# Patient Record
Sex: FEMALE | Race: WHITE | NOT HISPANIC OR LATINO | Employment: PART TIME | URBAN - NONMETROPOLITAN AREA
[De-identification: names, ages, dates, MRNs, and addresses within clinical notes are randomized per-mention and may not be internally consistent; named-entity substitution may affect disease eponyms.]

---

## 2023-02-28 LAB
ANION GAP IN SER/PLAS: 10 MMOL/L (ref 10–20)
CALCIUM (MG/DL) IN SER/PLAS: 9.5 MG/DL (ref 8.6–10.3)
CARBON DIOXIDE, TOTAL (MMOL/L) IN SER/PLAS: 27 MMOL/L (ref 21–32)
CHLORIDE (MMOL/L) IN SER/PLAS: 108 MMOL/L (ref 98–107)
CHOLESTEROL (MG/DL) IN SER/PLAS: 219 MG/DL (ref 0–199)
CHOLESTEROL IN HDL (MG/DL) IN SER/PLAS: 73 MG/DL
CHOLESTEROL/HDL RATIO: 3
CREATININE (MG/DL) IN SER/PLAS: 0.64 MG/DL (ref 0.5–1.05)
GFR FEMALE: >90 ML/MIN/1.73M2
GLUCOSE (MG/DL) IN SER/PLAS: 84 MG/DL (ref 74–99)
LDL: 128 MG/DL (ref 0–99)
POTASSIUM (MMOL/L) IN SER/PLAS: 4.1 MMOL/L (ref 3.5–5.3)
SODIUM (MMOL/L) IN SER/PLAS: 141 MMOL/L (ref 136–145)
TRIGLYCERIDE (MG/DL) IN SER/PLAS: 90 MG/DL (ref 0–149)
UREA NITROGEN (MG/DL) IN SER/PLAS: 18 MG/DL (ref 6–23)
VLDL: 18 MG/DL (ref 0–40)

## 2023-08-28 ENCOUNTER — APPOINTMENT (OUTPATIENT)
Dept: PRIMARY CARE | Facility: CLINIC | Age: 58
End: 2023-08-28
Payer: COMMERCIAL

## 2023-09-25 ENCOUNTER — TELEPHONE (OUTPATIENT)
Dept: PRIMARY CARE | Facility: CLINIC | Age: 58
End: 2023-09-25

## 2023-09-25 RX ORDER — PAROXETINE HYDROCHLORIDE 40 MG/1
TABLET, FILM COATED ORAL
Qty: 90 TABLET | OUTPATIENT
Start: 2023-09-25

## 2023-09-25 RX ORDER — PAROXETINE 10 MG/1
TABLET, FILM COATED ORAL
Qty: 90 TABLET | OUTPATIENT
Start: 2023-09-25

## 2023-09-27 NOTE — TELEPHONE ENCOUNTER
Needs refills on two prescriptions- states she is at work and doesn't know the names- please call after 1p

## 2023-09-29 DIAGNOSIS — F41.9 ANXIETY: ICD-10-CM

## 2023-09-29 DIAGNOSIS — F32.A DEPRESSION, UNSPECIFIED DEPRESSION TYPE: ICD-10-CM

## 2023-10-02 RX ORDER — PAROXETINE 10 MG/1
TABLET, FILM COATED ORAL
Qty: 90 TABLET | OUTPATIENT
Start: 2023-10-02

## 2023-10-02 RX ORDER — BUSPIRONE HYDROCHLORIDE 15 MG/1
15 TABLET ORAL 2 TIMES DAILY
Qty: 180 TABLET | OUTPATIENT
Start: 2023-10-02

## 2023-10-02 RX ORDER — PAROXETINE HYDROCHLORIDE 40 MG/1
TABLET, FILM COATED ORAL
Qty: 90 TABLET | OUTPATIENT
Start: 2023-10-02

## 2023-10-03 ENCOUNTER — DOCUMENTATION (OUTPATIENT)
Dept: PRIMARY CARE | Facility: CLINIC | Age: 58
End: 2023-10-03
Payer: COMMERCIAL

## 2023-10-03 RX ORDER — PAROXETINE 10 MG/1
1 TABLET, FILM COATED ORAL DAILY
COMMUNITY
Start: 2021-04-26 | End: 2023-10-03 | Stop reason: SDUPTHER

## 2023-10-03 RX ORDER — PAROXETINE HYDROCHLORIDE 40 MG/1
1 TABLET, FILM COATED ORAL DAILY
COMMUNITY
End: 2023-10-03 | Stop reason: SDUPTHER

## 2023-10-03 RX ORDER — PAROXETINE 10 MG/1
10 TABLET, FILM COATED ORAL DAILY
Qty: 30 TABLET | Refills: 0 | Status: SHIPPED | OUTPATIENT
Start: 2023-10-03 | End: 2023-11-10 | Stop reason: SDUPTHER

## 2023-10-03 RX ORDER — PAROXETINE HYDROCHLORIDE 40 MG/1
40 TABLET, FILM COATED ORAL DAILY
Qty: 30 TABLET | Refills: 0 | Status: SHIPPED | OUTPATIENT
Start: 2023-10-03 | End: 2023-11-10 | Stop reason: SDUPTHER

## 2023-11-08 ENCOUNTER — TELEPHONE (OUTPATIENT)
Dept: PRIMARY CARE | Facility: CLINIC | Age: 58
End: 2023-11-08
Payer: COMMERCIAL

## 2023-11-08 DIAGNOSIS — F41.9 ANXIETY: ICD-10-CM

## 2023-11-08 DIAGNOSIS — F32.A DEPRESSION, UNSPECIFIED DEPRESSION TYPE: ICD-10-CM

## 2023-11-09 RX ORDER — PAROXETINE 10 MG/1
10 TABLET, FILM COATED ORAL DAILY
Qty: 30 TABLET | Refills: 0 | OUTPATIENT
Start: 2023-11-09

## 2023-11-09 RX ORDER — PAROXETINE HYDROCHLORIDE 40 MG/1
40 TABLET, FILM COATED ORAL DAILY
Qty: 30 TABLET | Refills: 0 | OUTPATIENT
Start: 2023-11-09

## 2023-11-10 RX ORDER — PAROXETINE 10 MG/1
10 TABLET, FILM COATED ORAL DAILY
Qty: 90 TABLET | Refills: 3 | Status: SHIPPED | OUTPATIENT
Start: 2023-11-10 | End: 2024-11-04

## 2023-11-10 RX ORDER — PAROXETINE HYDROCHLORIDE 40 MG/1
40 TABLET, FILM COATED ORAL DAILY
Qty: 90 TABLET | Refills: 3 | Status: SHIPPED | OUTPATIENT
Start: 2023-11-10 | End: 2024-11-04

## 2023-12-13 ENCOUNTER — OFFICE VISIT (OUTPATIENT)
Dept: PRIMARY CARE | Facility: CLINIC | Age: 58
End: 2023-12-13
Payer: COMMERCIAL

## 2023-12-13 VITALS
HEART RATE: 91 BPM | BODY MASS INDEX: 28.95 KG/M2 | OXYGEN SATURATION: 95 % | DIASTOLIC BLOOD PRESSURE: 96 MMHG | SYSTOLIC BLOOD PRESSURE: 138 MMHG | WEIGHT: 163.4 LBS | HEIGHT: 63 IN

## 2023-12-13 DIAGNOSIS — M06.9 RHEUMATOID ARTHRITIS, INVOLVING UNSPECIFIED SITE, UNSPECIFIED WHETHER RHEUMATOID FACTOR PRESENT (MULTI): Primary | ICD-10-CM

## 2023-12-13 DIAGNOSIS — Z13.220 LIPID SCREENING: ICD-10-CM

## 2023-12-13 DIAGNOSIS — Z00.00 HEALTHCARE MAINTENANCE: ICD-10-CM

## 2023-12-13 PROCEDURE — 1036F TOBACCO NON-USER: CPT | Performed by: STUDENT IN AN ORGANIZED HEALTH CARE EDUCATION/TRAINING PROGRAM

## 2023-12-13 PROCEDURE — 99213 OFFICE O/P EST LOW 20 MIN: CPT | Performed by: STUDENT IN AN ORGANIZED HEALTH CARE EDUCATION/TRAINING PROGRAM

## 2023-12-13 RX ORDER — ETANERCEPT 50 MG/ML
50 SOLUTION SUBCUTANEOUS
COMMUNITY
Start: 2020-05-20

## 2023-12-13 RX ORDER — DIPHENHYDRAMINE HCL 50 MG
CAPSULE ORAL
COMMUNITY
End: 2024-02-22 | Stop reason: ENTERED-IN-ERROR

## 2023-12-13 RX ORDER — PNV NO.95/FERROUS FUM/FOLIC AC 28MG-0.8MG
100 TABLET ORAL
COMMUNITY
Start: 2007-10-10

## 2023-12-13 RX ORDER — AMOXICILLIN 500 MG
2 CAPSULE ORAL
COMMUNITY

## 2023-12-13 RX ORDER — AMPICILLIN TRIHYDRATE 250 MG
2 CAPSULE ORAL 2 TIMES DAILY
COMMUNITY

## 2023-12-13 RX ORDER — LEFLUNOMIDE 20 MG/1
20 TABLET ORAL EVERY 24 HOURS
COMMUNITY
Start: 2020-05-20

## 2023-12-13 RX ORDER — ASCORBIC ACID 250 MG
250 TABLET ORAL DAILY
COMMUNITY

## 2023-12-13 RX ORDER — DENOSUMAB 60 MG/ML
60 INJECTION SUBCUTANEOUS ONCE
COMMUNITY
Start: 2023-10-30 | End: 2024-02-22 | Stop reason: ENTERED-IN-ERROR

## 2023-12-13 RX ORDER — CALCIUM CARBONATE 300MG(750)
1 TABLET,CHEWABLE ORAL DAILY
COMMUNITY

## 2023-12-13 RX ORDER — BUSPIRONE HYDROCHLORIDE 15 MG/1
15 TABLET ORAL EVERY 12 HOURS
COMMUNITY
Start: 2022-10-10 | End: 2023-12-26

## 2023-12-13 RX ORDER — DOCUSATE SODIUM 100 MG/1
200 CAPSULE, LIQUID FILLED ORAL NIGHTLY
COMMUNITY

## 2023-12-13 RX ORDER — MULTIVITAMIN
1 TABLET ORAL
COMMUNITY

## 2023-12-13 ASSESSMENT — PATIENT HEALTH QUESTIONNAIRE - PHQ9
2. FEELING DOWN, DEPRESSED OR HOPELESS: NOT AT ALL
1. LITTLE INTEREST OR PLEASURE IN DOING THINGS: NOT AT ALL
SUM OF ALL RESPONSES TO PHQ9 QUESTIONS 1 AND 2: 0

## 2023-12-13 NOTE — PROGRESS NOTES
Subjective   Patient ID: Bonny Koehler is a 58 y.o. female who presents for Sick (PT is here today for possible heart issues, she reports she has a dx for osteoporosis and takes a shot for this 2 times a year for this and got the first injection in Nov. Thinks she was having side effects from the shot, was having severe hip pain, could not sleep and itching skin. Dr dismissed this, pt believes this was all do to the injection. Knows she has high cholesterol, wanted to do try this naturally. Specialist wanted to rule out everything but she knows this was caused by the shot.  ).    HPI    Patient is here to discuss about multiple symptoms that she has been having since taking Prolia last month. Reports that she started to have symptoms by 2-3 days after the injection.   Hip pain - improved after a back injection and exercises.   Itching   Insomnia  Pain in the neck, weakness in the neck muscles  Hip pain  Jaw pain/gum pain    Hyperlipidemia: concerned about hyperlipidemia. Last lab in the chart is in normal range. But she thinks a more recent doug was higher.   She is planning to work on conservative measures.     Hypertension: Blood pressure is elevated today.   Has been normal in the past. This is listed as one of the side-effects of prolia as well. .Previous blood pressures have been normal   Recommended to monitor BP at home and let us know if persistently elevated.     Review of Systems  ROS negative except discussed above in HPI.    Vitals:    12/13/23 1138   BP: (!) 138/96   Pulse: 91   SpO2: 95%     Objective   Physical Exam  Constitutional:       Appearance: Normal appearance.   Cardiovascular:      Rate and Rhythm: Normal rate and regular rhythm.   Pulmonary:      Effort: Pulmonary effort is normal.      Breath sounds: Normal breath sounds.   Musculoskeletal:      Cervical back: Normal range of motion and neck supple.   Lymphadenopathy:      Cervical: No cervical adenopathy.   Neurological:      Mental  Status: She is alert.           Assessment/Plan   Bonny was seen today for sick.  Diagnoses and all orders for this visit:  Rheumatoid arthritis, involving unspecified site, unspecified whether rheumatoid factor present (CMS/[facility]) (Primary)  Lipid screening  -     Lipid Panel; Future  Healthcare maintenance  -     Basic Metabolic Panel; Future      Follow up in 3 months. Sooner if blood pressure is high.          Gulshan Dodge MD MPH

## 2023-12-20 DIAGNOSIS — F32.A DEPRESSION, UNSPECIFIED DEPRESSION TYPE: ICD-10-CM

## 2023-12-20 DIAGNOSIS — F41.9 ANXIETY: Primary | ICD-10-CM

## 2023-12-26 RX ORDER — BUSPIRONE HYDROCHLORIDE 15 MG/1
15 TABLET ORAL 2 TIMES DAILY
Qty: 180 TABLET | Refills: 3 | Status: SHIPPED | OUTPATIENT
Start: 2023-12-26 | End: 2024-12-25

## 2024-01-24 ENCOUNTER — TELEPHONE (OUTPATIENT)
Dept: PRIMARY CARE | Facility: CLINIC | Age: 59
End: 2024-01-24
Payer: COMMERCIAL

## 2024-01-24 DIAGNOSIS — R53.83 OTHER FATIGUE: ICD-10-CM

## 2024-01-24 DIAGNOSIS — M06.9 RHEUMATOID ARTHRITIS, INVOLVING UNSPECIFIED SITE, UNSPECIFIED WHETHER RHEUMATOID FACTOR PRESENT (MULTI): Primary | ICD-10-CM

## 2024-01-24 NOTE — TELEPHONE ENCOUNTER
Asking if she can get orders for iron and thyroid for her upcoming appointment in March. Asking for a call back.

## 2024-02-22 ENCOUNTER — APPOINTMENT (OUTPATIENT)
Dept: RADIOLOGY | Facility: HOSPITAL | Age: 59
End: 2024-02-22
Payer: COMMERCIAL

## 2024-02-22 ENCOUNTER — HOSPITAL ENCOUNTER (EMERGENCY)
Facility: HOSPITAL | Age: 59
Discharge: HOME | End: 2024-02-22
Attending: EMERGENCY MEDICINE
Payer: COMMERCIAL

## 2024-02-22 ENCOUNTER — HOSPITAL ENCOUNTER (OUTPATIENT)
Dept: CARDIOLOGY | Facility: HOSPITAL | Age: 59
Discharge: HOME | End: 2024-02-22
Payer: COMMERCIAL

## 2024-02-22 VITALS
WEIGHT: 160 LBS | RESPIRATION RATE: 16 BRPM | SYSTOLIC BLOOD PRESSURE: 129 MMHG | HEART RATE: 82 BPM | OXYGEN SATURATION: 97 % | DIASTOLIC BLOOD PRESSURE: 79 MMHG | TEMPERATURE: 97.2 F | BODY MASS INDEX: 29.44 KG/M2 | HEIGHT: 62 IN

## 2024-02-22 DIAGNOSIS — R07.9 CHEST PAIN, UNSPECIFIED TYPE: Primary | ICD-10-CM

## 2024-02-22 LAB
ALBUMIN SERPL BCP-MCNC: 4.2 G/DL (ref 3.4–5)
ALP SERPL-CCNC: 52 U/L (ref 33–110)
ALT SERPL W P-5'-P-CCNC: 25 U/L (ref 7–45)
ANION GAP SERPL CALC-SCNC: 10 MMOL/L (ref 10–20)
AST SERPL W P-5'-P-CCNC: 26 U/L (ref 9–39)
BASOPHILS # BLD AUTO: 0.07 X10*3/UL (ref 0–0.1)
BASOPHILS NFR BLD AUTO: 1.4 %
BILIRUB SERPL-MCNC: 0.6 MG/DL (ref 0–1.2)
BUN SERPL-MCNC: 18 MG/DL (ref 6–23)
CALCIUM SERPL-MCNC: 9.2 MG/DL (ref 8.6–10.3)
CARDIAC TROPONIN I PNL SERPL HS: 3 NG/L (ref 0–13)
CARDIAC TROPONIN I PNL SERPL HS: 3 NG/L (ref 0–13)
CHLORIDE SERPL-SCNC: 109 MMOL/L (ref 98–107)
CO2 SERPL-SCNC: 25 MMOL/L (ref 21–32)
CREAT SERPL-MCNC: 0.57 MG/DL (ref 0.5–1.05)
D DIMER PPP FEU-MCNC: 258 NG/ML FEU
EGFRCR SERPLBLD CKD-EPI 2021: >90 ML/MIN/1.73M*2
EOSINOPHIL # BLD AUTO: 0.1 X10*3/UL (ref 0–0.7)
EOSINOPHIL NFR BLD AUTO: 2 %
ERYTHROCYTE [DISTWIDTH] IN BLOOD BY AUTOMATED COUNT: 12.6 % (ref 11.5–14.5)
FLUAV RNA RESP QL NAA+PROBE: NOT DETECTED
FLUBV RNA RESP QL NAA+PROBE: NOT DETECTED
GLUCOSE SERPL-MCNC: 80 MG/DL (ref 74–99)
HCT VFR BLD AUTO: 41.3 % (ref 36–46)
HGB BLD-MCNC: 13.5 G/DL (ref 12–16)
IMM GRANULOCYTES # BLD AUTO: 0 X10*3/UL (ref 0–0.7)
IMM GRANULOCYTES NFR BLD AUTO: 0 % (ref 0–0.9)
LYMPHOCYTES # BLD AUTO: 1.8 X10*3/UL (ref 1.2–4.8)
LYMPHOCYTES NFR BLD AUTO: 36.7 %
MCH RBC QN AUTO: 30.3 PG (ref 26–34)
MCHC RBC AUTO-ENTMCNC: 32.7 G/DL (ref 32–36)
MCV RBC AUTO: 93 FL (ref 80–100)
MONOCYTES # BLD AUTO: 0.64 X10*3/UL (ref 0.1–1)
MONOCYTES NFR BLD AUTO: 13 %
NEUTROPHILS # BLD AUTO: 2.3 X10*3/UL (ref 1.2–7.7)
NEUTROPHILS NFR BLD AUTO: 46.9 %
NRBC BLD-RTO: 0 /100 WBCS (ref 0–0)
PLATELET # BLD AUTO: 168 X10*3/UL (ref 150–450)
POTASSIUM SERPL-SCNC: 4.1 MMOL/L (ref 3.5–5.3)
PROT SERPL-MCNC: 6.8 G/DL (ref 6.4–8.2)
RBC # BLD AUTO: 4.46 X10*6/UL (ref 4–5.2)
SARS-COV-2 RNA RESP QL NAA+PROBE: NOT DETECTED
SODIUM SERPL-SCNC: 140 MMOL/L (ref 136–145)
WBC # BLD AUTO: 4.9 X10*3/UL (ref 4.4–11.3)

## 2024-02-22 PROCEDURE — 70450 CT HEAD/BRAIN W/O DYE: CPT | Performed by: RADIOLOGY

## 2024-02-22 PROCEDURE — 84484 ASSAY OF TROPONIN QUANT: CPT | Performed by: NURSE PRACTITIONER

## 2024-02-22 PROCEDURE — 99285 EMERGENCY DEPT VISIT HI MDM: CPT | Mod: 25

## 2024-02-22 PROCEDURE — 36415 COLL VENOUS BLD VENIPUNCTURE: CPT | Performed by: NURSE PRACTITIONER

## 2024-02-22 PROCEDURE — 85025 COMPLETE CBC W/AUTO DIFF WBC: CPT | Performed by: NURSE PRACTITIONER

## 2024-02-22 PROCEDURE — 93005 ELECTROCARDIOGRAM TRACING: CPT

## 2024-02-22 PROCEDURE — 87636 SARSCOV2 & INF A&B AMP PRB: CPT | Performed by: NURSE PRACTITIONER

## 2024-02-22 PROCEDURE — 71045 X-RAY EXAM CHEST 1 VIEW: CPT

## 2024-02-22 PROCEDURE — 96372 THER/PROPH/DIAG INJ SC/IM: CPT

## 2024-02-22 PROCEDURE — 85379 FIBRIN DEGRADATION QUANT: CPT | Performed by: NURSE PRACTITIONER

## 2024-02-22 PROCEDURE — 2500000004 HC RX 250 GENERAL PHARMACY W/ HCPCS (ALT 636 FOR OP/ED): Performed by: NURSE PRACTITIONER

## 2024-02-22 PROCEDURE — 71045 X-RAY EXAM CHEST 1 VIEW: CPT | Performed by: RADIOLOGY

## 2024-02-22 PROCEDURE — 70450 CT HEAD/BRAIN W/O DYE: CPT

## 2024-02-22 PROCEDURE — 80053 COMPREHEN METABOLIC PANEL: CPT | Performed by: NURSE PRACTITIONER

## 2024-02-22 RX ORDER — TALC
6 POWDER (GRAM) TOPICAL NIGHTLY
COMMUNITY

## 2024-02-22 RX ORDER — KETOROLAC TROMETHAMINE 30 MG/ML
30 INJECTION, SOLUTION INTRAMUSCULAR; INTRAVENOUS ONCE
Status: COMPLETED | OUTPATIENT
Start: 2024-02-22 | End: 2024-02-22

## 2024-02-22 RX ORDER — CHOLECALCIFEROL (VITAMIN D3) 25 MCG
1000 TABLET ORAL DAILY
COMMUNITY

## 2024-02-22 RX ORDER — OMEPRAZOLE 20 MG/1
20 TABLET, DELAYED RELEASE ORAL AS NEEDED
COMMUNITY

## 2024-02-22 RX ORDER — NAPROXEN 250 MG/1
250 TABLET ORAL AS NEEDED
COMMUNITY

## 2024-02-22 RX ORDER — KETOROLAC TROMETHAMINE 30 MG/ML
15 INJECTION, SOLUTION INTRAMUSCULAR; INTRAVENOUS ONCE
Status: DISCONTINUED | OUTPATIENT
Start: 2024-02-22 | End: 2024-02-22

## 2024-02-22 RX ORDER — DEXTROMETHORPHAN HYDROBROMIDE, GUAIFENESIN 5; 100 MG/5ML; MG/5ML
1300 LIQUID ORAL EVERY 8 HOURS PRN
COMMUNITY

## 2024-02-22 RX ORDER — PREDNISONE 2.5 MG/1
2.5 TABLET ORAL AS NEEDED
COMMUNITY

## 2024-02-22 RX ADMIN — KETOROLAC TROMETHAMINE 30 MG: 30 INJECTION, SOLUTION INTRAMUSCULAR; INTRAVENOUS at 15:50

## 2024-02-22 ASSESSMENT — COLUMBIA-SUICIDE SEVERITY RATING SCALE - C-SSRS
6. HAVE YOU EVER DONE ANYTHING, STARTED TO DO ANYTHING, OR PREPARED TO DO ANYTHING TO END YOUR LIFE?: NO
2. HAVE YOU ACTUALLY HAD ANY THOUGHTS OF KILLING YOURSELF?: NO
1. IN THE PAST MONTH, HAVE YOU WISHED YOU WERE DEAD OR WISHED YOU COULD GO TO SLEEP AND NOT WAKE UP?: NO

## 2024-02-22 ASSESSMENT — HEART SCORE
RISK FACTORS: 1-2 RISK FACTORS
HEART SCORE: 3
HISTORY: MODERATELY SUSPICIOUS
ECG: NORMAL
AGE: 45-64
TROPONIN: LESS THAN OR EQUAL TO NORMAL LIMIT

## 2024-02-22 ASSESSMENT — PAIN - FUNCTIONAL ASSESSMENT: PAIN_FUNCTIONAL_ASSESSMENT: 0-10

## 2024-02-22 ASSESSMENT — PAIN SCALES - GENERAL: PAINLEVEL_OUTOF10: 5 - MODERATE PAIN

## 2024-02-22 NOTE — ED PROVIDER NOTES
"Emergency Department Encounter  [facility] EMERGENCY MEDICINE    Patient: Bonny Koehler  MRN: [id number]  : 1965  Date of Evaluation: 2024  ED Provider: WILNER Arevalo    ED care was supervised by Dr. Torres who independently examined and evaluated the patient. Please see their attestation note for further details.    Limitations to history: none  Independent Historian: self  Records reviewed: Care everywhere, paper chart, Inpatient and outpatient notes    Chief Complaint       Chief Complaint   Patient presents with    Dizziness     Patient reports for 1 week having episode of dizziness and difficulty \"saying what I am thinking\", chest pain, SOB with exertion, L arm pain and a headache. Denies fever or urinary difficulties, N/V/D     [place]    (Location/Symptom, Timing/Onset, Context/Setting, Quality, Duration, Modifying Factors, Severity) Note limiting factors.   Bonny Koehler is a 58 y.o. female who presents to the emergency department complaining of 1 week of intermittent symptoms, initially began as left arm pain that went from the neck all the way down her arm, has a history of rheumatoid arthritis and felt like it was an exacerbation of her rheumatoid arthritis.  Then started having some shortness of breath with exertion over the last few days, chest burning that has been intermittent but more constant over the last few days.  Denies any cough, hemoptysis, abdominal pain, does endorse some nausea and intermittent episodes of diaphoresis.  Also reporting lightheadedness, \"foggy feeling\" in the head with some difficulty getting her words out that comes and goes.  Denies any slurred speech, unilateral weakness, visual changes.  Does endorse palpitations, does report more recent issues with her memory.  Also states that she was having some symptoms after starting a new medication in November, and injection for her osteoporosis and believes that a lot of her issues are due to " this medication.    ROS:     Review of Systems  14 systems reviewed and otherwise acutely negative except as in the [place].          Past History     Past Medical History:   Diagnosis Date    Osteoporosis     RA (rheumatoid arthritis) (CMS/[facility])      Past Surgical History:   Procedure Laterality Date    OTHER SURGICAL HISTORY  2020     section    OTHER SURGICAL HISTORY  2020    Hemorrhoidectomy    OTHER SURGICAL HISTORY  2020    Colonoscopy    OTHER SURGICAL HISTORY  2021    Hysterectomy     Social History     Socioeconomic History    Marital status:      Spouse name: None    Number of children: None    Years of education: None    Highest education level: None   Occupational History    None   Tobacco Use    Smoking status: Former     Types: Cigarettes     Quit date:      Years since quitting: 10.1    Smokeless tobacco: Never   Vaping Use    Vaping Use: Former   Substance and Sexual Activity    Alcohol use: Not Currently    Drug use: Never    Sexual activity: None   Other Topics Concern    None   Social History Narrative    None     Social Determinants of Health     Financial Resource Strain: Not on file   Food Insecurity: Not on file   Transportation Needs: Not on file   Physical Activity: Not on file   Stress: Not on file   Social Connections: Not on file   Intimate Partner Violence: Not on file   Housing Stability: Not on file       Medications/Allergies     Previous Medications    ASCORBIC ACID (VITAMIN C) 100 MG TABLET    Take by mouth.    BUSPIRONE (BUSPAR) 15 MG TABLET    Take 1 tablet (15 mg) by mouth 2 times a day.    CYANOCOBALAMIN (VITAMIN B-12) 100 MCG TABLET    Take 1 tablet (100 mcg) by mouth once daily.    DOCUSATE SODIUM (COLACE) 100 MG CAPSULE    Take 1 capsule (100 mg) by mouth 2 times a day.    ENBREL SURECLICK 50 MG/ML (1 ML) PEN INJECTOR PEN INJECTION    Inject 1 mL (50 mg) under the skin 1 (one) time per week.    L. ACIDOPHILUS/BIFID. ANIMALIS 32 BILLION  CELL CAPSULE    Take by mouth.    LEFLUNOMIDE (ARAVA) 20 MG TABLET    Take 1 tablet (20 mg) by mouth once every 24 hours.    MAGNESIUM OXIDE (MAG-OX) 400 MG TABLET    Take 1 tablet (400 mg) by mouth once daily.    MULTIVITAMIN TABLET    Take 1 tablet by mouth once daily.    OMEGA-3 FATTY ACIDS-FISH -1,000 MG CAPSULE    Take 2 capsules (2,000 mg) by mouth once daily.    PAROXETINE (PAXIL) 10 MG TABLET    Take 1 tablet (10 mg) by mouth once daily.    PAROXETINE (PAXIL) 40 MG TABLET    Take 1 tablet (40 mg) by mouth once daily.    RED YEAST RICE 600 MG CAPSULE    once every 24 hours.     No Known Allergies     Physical Exam       ED Triage Vitals [02/22/24 1410]   Temperature Heart Rate Respirations BP   36.2 °C (97.2 °F) 85 18 158/85      Pulse Ox Temp src Heart Rate Source Patient Position   96 % -- -- --      BP Location FiO2 (%)     -- --         Physical Exam    GENERAL:  The patient appears nourished and normally developed. Vital signs as documented.     HEENT:  Head normocephalic, atraumatic, EOMs intact, PERRLA, Mucous membranes moist. Nares patent without copious rhinorrhea.  No lymphadenopathy.    PULMONARY:  Lungs are clear to auscultation, without any respiratory distress. Able to speak full sentences, no accessory muscle use    CARDIAC:   Normal rate. No murmurs, rubs or gallops    ABDOMEN:  Soft, non-distended, non-tender, BS positive x 4 quadrants, No rebound or guarding, no peritoneal signs, no CVA tenderness, no masses or organomegaly    MUSCULOSKELETAL:   Able to ambulate, Non edematous, with no obvious deformities. Pulses intact distal    SKIN:   Good color, with no significant rashes.  No pallor.    NEURO:  No obvious neurological deficits, normal sensation and strength bilaterally.  Able to follow commands, NIH 0, CN 2-12 intact.        Diagnostics   Labs:  Results for orders placed or performed during the hospital encounter of 02/22/24   Sars-CoV-2 and Influenza A/B PCR   Result Value Ref  Range    Flu A Result Not Detected Not Detected    Flu B Result Not Detected Not Detected    Coronavirus 2019, PCR Not Detected Not Detected   CBC and Auto Differential   Result Value Ref Range    WBC 4.9 4.4 - 11.3 x10*3/uL    nRBC 0.0 0.0 - 0.0 /100 WBCs    RBC 4.46 4.00 - 5.20 x10*6/uL    Hemoglobin 13.5 12.0 - 16.0 g/dL    Hematocrit 41.3 36.0 - 46.0 %    MCV 93 80 - 100 fL    MCH 30.3 26.0 - 34.0 pg    MCHC 32.7 32.0 - 36.0 g/dL    RDW 12.6 11.5 - 14.5 %    Platelets 168 150 - 450 x10*3/uL    Neutrophils % 46.9 40.0 - 80.0 %    Immature Granulocytes %, Automated 0.0 0.0 - 0.9 %    Lymphocytes % 36.7 13.0 - 44.0 %    Monocytes % 13.0 2.0 - 10.0 %    Eosinophils % 2.0 0.0 - 6.0 %    Basophils % 1.4 0.0 - 2.0 %    Neutrophils Absolute 2.30 1.20 - 7.70 x10*3/uL    Immature Granulocytes Absolute, Automated 0.00 0.00 - 0.70 x10*3/uL    Lymphocytes Absolute 1.80 1.20 - 4.80 x10*3/uL    Monocytes Absolute 0.64 0.10 - 1.00 x10*3/uL    Eosinophils Absolute 0.10 0.00 - 0.70 x10*3/uL    Basophils Absolute 0.07 0.00 - 0.10 x10*3/uL   Comprehensive metabolic panel   Result Value Ref Range    Glucose 80 74 - 99 mg/dL    Sodium 140 136 - 145 mmol/L    Potassium 4.1 3.5 - 5.3 mmol/L    Chloride 109 (H) 98 - 107 mmol/L    Bicarbonate 25 21 - 32 mmol/L    Anion Gap 10 10 - 20 mmol/L    Urea Nitrogen 18 6 - 23 mg/dL    Creatinine 0.57 0.50 - 1.05 mg/dL    eGFR >90 >60 mL/min/1.73m*2    Calcium 9.2 8.6 - 10.3 mg/dL    Albumin 4.2 3.4 - 5.0 g/dL    Alkaline Phosphatase 52 33 - 110 U/L    Total Protein 6.8 6.4 - 8.2 g/dL    AST 26 9 - 39 U/L    Bilirubin, Total 0.6 0.0 - 1.2 mg/dL    ALT 25 7 - 45 U/L   Troponin I, High Sensitivity   Result Value Ref Range    Troponin I, High Sensitivity 3 0 - 13 ng/L   D-Dimer, Quantitative Non VTE   Result Value Ref Range    D-Dimer Non VTE, Quant (ng/mL FEU) 258 <=500 ng/mL FEU   Troponin I, High Sensitivity   Result Value Ref Range    Troponin I, High Sensitivity 3 0 - 13 ng/L  "    Radiographs:  CT head wo IV contrast   Final Result   No acute intracranial process.                  MACRO:   None.        Signed by: Kiko Sifuentes 2/22/2024 3:25 PM   Dictation workstation:   [id number]      XR chest 1 view   Final Result   No focal infiltrate or pneumothorax is identified.        MACRO:   None.        Signed by: Lucius Patel 2/22/2024 2:40 PM   Dictation workstation:   [id number]          Procedures:   Procedures           Assessment   In brief, Bonny Koehler is a 58 y.o. female who presented to the emergency department with multiple complaints, left arm pain, diaphoresis, nausea, chest pain, headache, \"brain fog\".    Plan   IV, lab work, EKG, imaging    Differentials   ACS   viral infection  Cervical radiculopathy  TIA  Anxiety  PE    ED Course     ED Course as of 02/22/24 1629   Thu Feb 22, 2024   1403 EKG performed at 1403 indication of chest pain, normal sinus rhythm, rate of 76, no ST elevation, normal intervals, normal axis [ML]   1618 Troponin I, High Sensitivity [JS]      ED Course User Index  [JS] Chapincito Torres MD  [ML] MARYANN Arevalo-CNP         Diagnoses as of 02/22/24 1629   Chest pain, unspecified type       Visit Vitals  /71   Pulse 79   Temp 36.2 °C (97.2 °F)   Resp 16   Ht 1.575 m (5' 2\")   Wt 72.6 kg (160 lb)   SpO2 (!) 93%   BMI 29.26 kg/m²   Smoking Status Former   BSA 1.78 m²       Medications   ketorolac (Toradol) injection 30 mg (30 mg intramuscular Given 2/22/24 1550)       Plan of care discussed, patient is stable appearing, EKG with no ST elevation, troponins were negative, second troponin were negative, heart score of 3, discussed admission versus outpatient follow-up, was able to contact cardiology and patient to have follow-up tomorrow at 245.  Patient is neurologically intact with an NIH of 0, CT scan was obtained and reviewed, patient will be discharged home in stable condition, educated on any worsening signs and symptoms to return to the " emergency department      Final Impression      1. Chest pain, unspecified type          DISPOSITION  Disposition: Discharge to home  Patient condition is stable    Comment: Please note this report has been produced using speech recognition software and may contain errors related to that system including errors in grammar, punctuation, and spelling, as well as words and phrases that may be inappropriate.  If there are any questions or concerns please feel free to contact the dictating provider for clarification.    WILNER Arevalo APRN-CNP  02/22/24 5230

## 2024-02-23 ENCOUNTER — OFFICE VISIT (OUTPATIENT)
Dept: CARDIOLOGY | Facility: CLINIC | Age: 59
End: 2024-02-23
Payer: COMMERCIAL

## 2024-02-23 DIAGNOSIS — R06.09 DOE (DYSPNEA ON EXERTION): Primary | ICD-10-CM

## 2024-02-23 DIAGNOSIS — R07.9 CHEST PAIN, UNSPECIFIED TYPE: ICD-10-CM

## 2024-02-23 PROCEDURE — 1036F TOBACCO NON-USER: CPT | Performed by: STUDENT IN AN ORGANIZED HEALTH CARE EDUCATION/TRAINING PROGRAM

## 2024-02-23 PROCEDURE — 99204 OFFICE O/P NEW MOD 45 MIN: CPT | Performed by: STUDENT IN AN ORGANIZED HEALTH CARE EDUCATION/TRAINING PROGRAM

## 2024-02-23 NOTE — PROGRESS NOTES
Chief Complaint   Patient presents with    np er follow up chest discomfort sob ha dizzy      HPI:  I was requested by Dr. Dodge to evaluate this patient in consultation for cardiac assessment.    Mrs. Bonny Koehler is a 58 y.o. year old former smoker female patient with past medical history significant for rheumatoid arthritis, insomnia, osteoporosis, coming for assessment of chest pain. Patient with recent visits to ER due to chest pain, last visit 2024. She endorses 1 week of intermittent symptoms, initially began as left arm pain that went from the neck all the way down her arm, has a history of rheumatoid arthritis and felt like it was an exacerbation of her rheumatoid arthritis. Then started having some shortness of breath with exertion over the last few days, chest burning that has been intermittent but more constant over the last few days. does endorse some nausea and intermittent episodes of diaphoresis. She also endorses episodes of situations when she cannot speak what she is thinking. Notably, she also states that she was having some symptoms after starting a new medication (Prolia) in November, and injection for her osteoporosis and believes that a lot of her issues are due to this medication.  She denies leg edema, headaches, fever, chills, orthopnea, paroxysmal nocturnal dyspnea or syncope.    EKG shows normal sinus rhythm with non-specific ST-T changes.      Past Medical History  Past Medical History:   Diagnosis Date    Osteoporosis     RA (rheumatoid arthritis) (CMS/[facility])        Past Surgical History  Past Surgical History:   Procedure Laterality Date    HYSTERECTOMY      hemorrage fix    OTHER SURGICAL HISTORY  2020     section    OTHER SURGICAL HISTORY  2020    Hemorrhoidectomy    OTHER SURGICAL HISTORY  2020    Colonoscopy    OTHER SURGICAL HISTORY  2021    Hysterectomy       Past Family History  Family History   Problem Relation Name Age of Onset     "Heart attack Mother      Hypertension Mother      Brain cancer Father      Uterine cancer Sister         Allergy History  No Known Allergies    Past Social History  Social History     Socioeconomic History    Marital status:      Spouse name: None    Number of children: None    Years of education: None    Highest education level: None   Occupational History    None   Tobacco Use    Smoking status: Former     Packs/day: 1.50     Years: 41.00     Additional pack years: 0.00     Total pack years: 61.50     Types: Cigarettes     Quit date: 2014     Years since quitting: 10.1    Smokeless tobacco: Never   Vaping Use    Vaping Use: Former   Substance and Sexual Activity    Alcohol use: Not Currently    Drug use: Never    Sexual activity: None   Other Topics Concern    None   Social History Narrative    None     Social Determinants of Health     Financial Resource Strain: Not on file   Food Insecurity: Not on file   Transportation Needs: Not on file   Physical Activity: Not on file   Stress: Not on file   Social Connections: Not on file   Intimate Partner Violence: Not on file   Housing Stability: Not on file       Social History     Tobacco Use   Smoking Status Former    Packs/day: 1.50    Years: 41.00    Additional pack years: 0.00    Total pack years: 61.50    Types: Cigarettes    Quit date: 2014    Years since quitting: 10.1   Smokeless Tobacco Never       Review of Systems:  A total of 12 systems have been reviewed and are negative except for the aforementioned findings described in HPI.     Objective Data:  Last Recorded Vitals:  Vitals:    02/23/24 1506   Pulse: 73   SpO2: 96%   Weight: 75.8 kg (167 lb)   Height: 1.575 m (5' 2\")       Last Labs:  CBC - 2/22/2024:  2:34 PM  4.9 13.5 168    41.3      CMP - 2/22/2024:  2:34 PM  9.2 6.8 26 --- 0.6   _ 4.2 25 52      PTT - No results in last year.  _   _ _     TROPHS   Date/Time Value Ref Range Status   02/22/2024 03:42 PM 3 0 - 13 ng/L Final   02/22/2024 02:34 PM " 3 0 - 13 ng/L Final     VLDL   Date/Time Value Ref Range Status   02/28/2023 08:36 AM 18 0 - 40 mg/dL Final   05/27/2022 08:13 AM 19 0 - 40 mg/dL Final   04/19/2021 10:33 AM 39 0 - 40 mg/dL Final        Patient Medications:  Outpatient Encounter Medications as of 2/23/2024   Medication Sig Dispense Refill    acetaminophen (Tylenol 8 HOUR) 650 mg ER tablet Take 2 tablets (1,300 mg) by mouth every 8 hours if needed for mild pain (1 - 3). Do not crush, chew, or split.      ascorbic acid (Vitamin C) 250 mg tablet Take 1 tablet (250 mg) by mouth once daily.      busPIRone (Buspar) 15 mg tablet Take 1 tablet (15 mg) by mouth 2 times a day. 180 tablet 3    cholecalciferol (Vitamin D3) 25 MCG (1000 UT) tablet Take 1 tablet (1,000 Units) by mouth once daily.      cyanocobalamin (Vitamin B-12) 100 mcg tablet Take 1 tablet (100 mcg) by mouth once daily.      docusate sodium (Colace) 100 mg capsule Take 2 capsules (200 mg) by mouth once daily at bedtime.      EnbreL SureClick 50 mg/mL (1 mL) pen injector pen injection Inject 1 mL (50 mg) under the skin 1 (one) time per week.      L. acidophilus/Bifid. animalis 32 billion cell capsule Take 1 capsule by mouth once daily at bedtime.      leflunomide (Arava) 20 mg tablet Take 1 tablet (20 mg) by mouth once every 24 hours.      magnesium oxide (Mag-Ox) 400 mg tablet Take 1 tablet (400 mg) by mouth once daily.      melatonin 3 mg tablet Take 2 tablets (6 mg) by mouth once daily at bedtime. Tri-Sleep (GNC) with valeran root and theanine      multivitamin tablet Take 1 tablet by mouth once daily.      naproxen (Naprosyn) 250 mg tablet Take 1 tablet (250 mg) by mouth if needed for mild pain (1 - 3).      omega-3 fatty acids-fish oil 300-1,000 mg capsule Take 2 capsules (2,000 mg) by mouth once daily.      omeprazole OTC (PriLOSEC OTC) 20 mg EC tablet Take 1 tablet (20 mg) by mouth if needed. Do not crush, chew, or split.      PARoxetine (Paxil) 10 mg tablet Take 1 tablet (10 mg) by  mouth once daily. 90 tablet 3    PARoxetine (Paxil) 40 mg tablet Take 1 tablet (40 mg) by mouth once daily. 90 tablet 3    predniSONE (Deltasone) 2.5 mg tablet Take 1 tablet (2.5 mg) by mouth if needed (RA flare).      red yeast rice 600 mg capsule Take 2 capsules by mouth 2 times a day.      [DISCONTINUED] CALCIUM CITRATE-VITAMIN D3 ORAL Take by mouth.      [DISCONTINUED] diphenhydrAMINE (Sleep Aid, diphenhydrAMINE,) 50 mg capsule Take by mouth.      [DISCONTINUED] Prolia 60 mg/mL syringe Inject 1 mL (60 mg) under the skin 1 time.       Facility-Administered Encounter Medications as of 2/23/2024   Medication Dose Route Frequency Provider Last Rate Last Admin    [COMPLETED] ketorolac (Toradol) injection 30 mg  30 mg intramuscular Once WILNER Arevalo   30 mg at 02/22/24 1550    [DISCONTINUED] ketorolac (Toradol) injection 15 mg  15 mg intravenous Once WILNER Arevalo           Physical Exam:  General: alert, oriented and in no acute distress  HEENT: NC/AT; EOMI; PERRLA, external ear is normal  Neck: supple; trachea midline; no masses; no JVD  Chest: clear breath sounds bilaterally; no wheezing  Cardio: regular rhythm, S1S2 normal, no murmurs  Abdomen: Soft, non-tender, non-distension, no organomegaly  Extremities: no clubbing/cyanosis/edema  Neuro: Grossly intact     Psychiatric: Normal mood and affect     Past Cardiology Results (Last 3 Years):  EKG:  ECG 12 lead 02/22/2024 (Preliminary)    Echo:  Echo Results:  No results found for this or any previous visit from the past 365 days.     Cath:  No results found for this or any previous visit from the past 1095 days.    CV NCDR CATHPCI V5 COLLECTION FORM   Stress Test:  No results found for this or any previous visit from the past 1095 days.    Cardiac Imaging:  No results found for this or any previous visit from the past 1095 days.       Assessment/Plan   Mrs. Bonny Koehler is a 58 y.o. year old former smoker female patient with past medical  history significant for rheumatoid arthritis, insomnia, osteoporosis, coming for assessment of chest pain.     Assessment    # Chest pain / SOB on exertion  - Patient with recent visits to ER due to chest pain, last visit 02/22/2024.   - She endorses 1 week of intermittent symptoms, initially began as left arm pain that went from the neck all the way down her arm, has a history of rheumatoid arthritis and felt like it was an exacerbation of her rheumatoid arthritis. Then started having some shortness of breath with exertion over the last few days, chest burning that has been intermittent but more constant over the last few days. does endorse some nausea and intermittent episodes of diaphoresis.   - Notably, she also states that she was having some symptoms after starting a new medication (Prolia) in November, and injection for her osteoporosis and believes that a lot of her issues are due to this medication.    - Symptoms may be related to the interaction of Prolia medication and rheumatoid arthritis  - Will request nuclear stress test, echo, CT calcium scoring, 24h holter monitor, US carotid bilaterally.  - Will optimize medications upon return.  - Follow up after tests.    # Insomnia  - Keep Melatonin.     This note was transcribed using the Dragon Dictation system. There may be grammatical, punctuation, or verbiage errors that occur with voice recognition programs.    Counseling greater than 50% of visit regarding all cardiac issues.    Thank you, Dr. Dodge, for allowing me to participate in the care of this patient. Please do not hesitate to contact me with any further questions or concerns.    Joseph Roe MD  Cardiology

## 2024-02-24 LAB
ATRIAL RATE: 76 BPM
P AXIS: 68 DEGREES
P OFFSET: 211 MS
P ONSET: 163 MS
PR INTERVAL: 120 MS
Q ONSET: 223 MS
QRS COUNT: 12 BEATS
QRS DURATION: 82 MS
QT INTERVAL: 382 MS
QTC CALCULATION(BAZETT): 429 MS
QTC FREDERICIA: 413 MS
R AXIS: 79 DEGREES
T AXIS: 73 DEGREES
T OFFSET: 414 MS
VENTRICULAR RATE: 76 BPM

## 2024-02-26 VITALS
SYSTOLIC BLOOD PRESSURE: 122 MMHG | DIASTOLIC BLOOD PRESSURE: 72 MMHG | OXYGEN SATURATION: 96 % | BODY MASS INDEX: 30.73 KG/M2 | HEIGHT: 62 IN | WEIGHT: 167 LBS | HEART RATE: 73 BPM

## 2024-03-11 ENCOUNTER — HOSPITAL ENCOUNTER (OUTPATIENT)
Dept: CARDIOLOGY | Facility: HOSPITAL | Age: 59
Discharge: HOME | End: 2024-03-11
Payer: COMMERCIAL

## 2024-03-11 ENCOUNTER — HOSPITAL ENCOUNTER (OUTPATIENT)
Dept: RADIOLOGY | Facility: HOSPITAL | Age: 59
Discharge: HOME | End: 2024-03-11
Payer: COMMERCIAL

## 2024-03-11 DIAGNOSIS — R06.09 DOE (DYSPNEA ON EXERTION): ICD-10-CM

## 2024-03-11 PROCEDURE — 3430000001 HC RX 343 DIAGNOSTIC RADIOPHARMACEUTICALS: Performed by: STUDENT IN AN ORGANIZED HEALTH CARE EDUCATION/TRAINING PROGRAM

## 2024-03-11 PROCEDURE — 93017 CV STRESS TEST TRACING ONLY: CPT

## 2024-03-11 PROCEDURE — A9502 TC99M TETROFOSMIN: HCPCS | Performed by: STUDENT IN AN ORGANIZED HEALTH CARE EDUCATION/TRAINING PROGRAM

## 2024-03-11 PROCEDURE — 78452 HT MUSCLE IMAGE SPECT MULT: CPT

## 2024-03-11 PROCEDURE — 93227 XTRNL ECG REC<48 HR R&I: CPT | Performed by: STUDENT IN AN ORGANIZED HEALTH CARE EDUCATION/TRAINING PROGRAM

## 2024-03-11 PROCEDURE — 9420000001 HC RT PATIENT EDUCATION 5 MIN

## 2024-03-11 PROCEDURE — 93225 XTRNL ECG REC<48 HRS REC: CPT

## 2024-03-11 PROCEDURE — 78452 HT MUSCLE IMAGE SPECT MULT: CPT | Performed by: STUDENT IN AN ORGANIZED HEALTH CARE EDUCATION/TRAINING PROGRAM

## 2024-03-11 RX ADMIN — TETROFOSMIN 10.9 MILLICURIE: 0.23 INJECTION, POWDER, LYOPHILIZED, FOR SOLUTION INTRAVENOUS at 07:05

## 2024-03-11 RX ADMIN — TETROFOSMIN 33 MILLICURIE: 0.23 INJECTION, POWDER, LYOPHILIZED, FOR SOLUTION INTRAVENOUS at 08:30

## 2024-03-12 ENCOUNTER — LAB (OUTPATIENT)
Dept: LAB | Facility: LAB | Age: 59
End: 2024-03-12
Payer: COMMERCIAL

## 2024-03-12 DIAGNOSIS — Z00.00 HEALTHCARE MAINTENANCE: ICD-10-CM

## 2024-03-12 DIAGNOSIS — M06.9 RHEUMATOID ARTHRITIS, INVOLVING UNSPECIFIED SITE, UNSPECIFIED WHETHER RHEUMATOID FACTOR PRESENT (MULTI): ICD-10-CM

## 2024-03-12 DIAGNOSIS — Z13.220 LIPID SCREENING: ICD-10-CM

## 2024-03-12 DIAGNOSIS — R53.83 OTHER FATIGUE: ICD-10-CM

## 2024-03-12 LAB
ANION GAP SERPL CALC-SCNC: 8 MMOL/L (ref 10–20)
BUN SERPL-MCNC: 13 MG/DL (ref 6–23)
CALCIUM SERPL-MCNC: 8.9 MG/DL (ref 8.6–10.3)
CHLORIDE SERPL-SCNC: 108 MMOL/L (ref 98–107)
CHOLEST SERPL-MCNC: 197 MG/DL (ref 0–199)
CHOLESTEROL/HDL RATIO: 3.1
CO2 SERPL-SCNC: 29 MMOL/L (ref 21–32)
CREAT SERPL-MCNC: 0.6 MG/DL (ref 0.5–1.05)
EGFRCR SERPLBLD CKD-EPI 2021: >90 ML/MIN/1.73M*2
GLUCOSE SERPL-MCNC: 90 MG/DL (ref 74–99)
HCT VFR BLD AUTO: 41.7 % (ref 36–46)
HDLC SERPL-MCNC: 64 MG/DL
HGB BLD-MCNC: 13.6 G/DL (ref 12–16)
LDLC SERPL CALC-MCNC: 114 MG/DL
NON HDL CHOLESTEROL: 133 MG/DL (ref 0–149)
POTASSIUM SERPL-SCNC: 4.3 MMOL/L (ref 3.5–5.3)
SODIUM SERPL-SCNC: 141 MMOL/L (ref 136–145)
T4 FREE SERPL-MCNC: 0.66 NG/DL (ref 0.61–1.12)
TRIGL SERPL-MCNC: 95 MG/DL (ref 0–149)
TSH SERPL-ACNC: 0.43 MIU/L (ref 0.44–3.98)
VLDL: 19 MG/DL (ref 0–40)

## 2024-03-12 PROCEDURE — 85014 HEMATOCRIT: CPT

## 2024-03-12 PROCEDURE — 36415 COLL VENOUS BLD VENIPUNCTURE: CPT

## 2024-03-12 PROCEDURE — 85018 HEMOGLOBIN: CPT

## 2024-03-12 PROCEDURE — 80048 BASIC METABOLIC PNL TOTAL CA: CPT

## 2024-03-12 PROCEDURE — 84439 ASSAY OF FREE THYROXINE: CPT

## 2024-03-12 PROCEDURE — 84443 ASSAY THYROID STIM HORMONE: CPT

## 2024-03-12 PROCEDURE — 80061 LIPID PANEL: CPT

## 2024-03-18 ENCOUNTER — APPOINTMENT (OUTPATIENT)
Dept: PRIMARY CARE | Facility: CLINIC | Age: 59
End: 2024-03-18
Payer: COMMERCIAL

## 2024-03-18 ENCOUNTER — HOSPITAL ENCOUNTER (OUTPATIENT)
Dept: CARDIOLOGY | Facility: HOSPITAL | Age: 59
Discharge: HOME | End: 2024-03-18
Payer: COMMERCIAL

## 2024-03-18 ENCOUNTER — HOSPITAL ENCOUNTER (OUTPATIENT)
Dept: VASCULAR MEDICINE | Facility: HOSPITAL | Age: 59
Discharge: HOME | End: 2024-03-18
Payer: COMMERCIAL

## 2024-03-18 ENCOUNTER — HOSPITAL ENCOUNTER (OUTPATIENT)
Dept: RADIOLOGY | Facility: HOSPITAL | Age: 59
Discharge: HOME | End: 2024-03-18
Payer: COMMERCIAL

## 2024-03-18 VITALS
OXYGEN SATURATION: 96 % | HEART RATE: 77 BPM | SYSTOLIC BLOOD PRESSURE: 141 MMHG | BODY MASS INDEX: 30.73 KG/M2 | DIASTOLIC BLOOD PRESSURE: 75 MMHG | WEIGHT: 167 LBS | HEIGHT: 62 IN | RESPIRATION RATE: 18 BRPM

## 2024-03-18 DIAGNOSIS — R09.89 OTHER SPECIFIED SYMPTOMS AND SIGNS INVOLVING THE CIRCULATORY AND RESPIRATORY SYSTEMS: ICD-10-CM

## 2024-03-18 DIAGNOSIS — R06.09 DOE (DYSPNEA ON EXERTION): ICD-10-CM

## 2024-03-18 DIAGNOSIS — R06.00 DYSPNEA, UNSPECIFIED: ICD-10-CM

## 2024-03-18 LAB
AORTIC VALVE MEAN GRADIENT: 5 MMHG
AORTIC VALVE PEAK VELOCITY: 1.39 M/S
AV PEAK GRADIENT: 7.7 MMHG
AVA (PEAK VEL): 2 CM2
AVA (VTI): 2.35 CM2
EJECTION FRACTION APICAL 4 CHAMBER: 57.6
EJECTION FRACTION: 62 %
LEFT ATRIUM VOLUME AREA LENGTH INDEX BSA: 17.2 ML/M2
LEFT VENTRICLE INTERNAL DIMENSION DIASTOLE: 4.34 CM (ref 3.5–6)
LEFT VENTRICULAR OUTFLOW TRACT DIAMETER: 1.8 CM
MITRAL VALVE E/A RATIO: 0.82
RIGHT VENTRICLE FREE WALL PEAK S': 11.9 CM/S
TRICUSPID ANNULAR PLANE SYSTOLIC EXCURSION: 1.6 CM

## 2024-03-18 PROCEDURE — 93306 TTE W/DOPPLER COMPLETE: CPT

## 2024-03-18 PROCEDURE — 93880 EXTRACRANIAL BILAT STUDY: CPT | Performed by: STUDENT IN AN ORGANIZED HEALTH CARE EDUCATION/TRAINING PROGRAM

## 2024-03-18 PROCEDURE — 93880 EXTRACRANIAL BILAT STUDY: CPT

## 2024-03-18 PROCEDURE — 93306 TTE W/DOPPLER COMPLETE: CPT | Performed by: STUDENT IN AN ORGANIZED HEALTH CARE EDUCATION/TRAINING PROGRAM

## 2024-03-18 PROCEDURE — 2500000004 HC RX 250 GENERAL PHARMACY W/ HCPCS (ALT 636 FOR OP/ED): Performed by: STUDENT IN AN ORGANIZED HEALTH CARE EDUCATION/TRAINING PROGRAM

## 2024-03-18 PROCEDURE — 75571 CT HRT W/O DYE W/CA TEST: CPT

## 2024-03-18 RX ADMIN — PERFLUTREN 1.5 ML OF DILUTION: 6.52 INJECTION, SUSPENSION INTRAVENOUS at 09:45

## 2024-03-19 ENCOUNTER — TELEPHONE (OUTPATIENT)
Dept: CARDIOLOGY | Facility: CLINIC | Age: 59
End: 2024-03-19
Payer: COMMERCIAL

## 2024-03-19 NOTE — TELEPHONE ENCOUNTER
Pt notified.    ----- Message from Joseph Roe MD sent at 3/18/2024  6:16 PM EDT -----  Please let the patient know that I have reviewed this test and the result was normal. The patient should keep current medication and normal activities at this point.   ----- Message -----  From: Jose Syngo - Cardiology Results In  Sent: 3/18/2024   5:54 PM EDT  To: Joseph Roe MD      
1

## 2024-03-19 NOTE — TELEPHONE ENCOUNTER
Pt notified.    ----- Message from Joseph Roe MD sent at 3/18/2024  6:14 PM EDT -----  Please let the patient know that I have reviewed this test and the result was normal. The patient should keep current medication and normal activities at this point.   ----- Message -----  From: Interface, Radiology Results In  Sent: 3/18/2024  12:12 PM EDT  To: Joseph Roe MD

## 2024-03-25 ENCOUNTER — OFFICE VISIT (OUTPATIENT)
Dept: PRIMARY CARE | Facility: CLINIC | Age: 59
End: 2024-03-25
Payer: COMMERCIAL

## 2024-03-25 ENCOUNTER — OFFICE VISIT (OUTPATIENT)
Dept: CARDIOLOGY | Facility: CLINIC | Age: 59
End: 2024-03-25
Payer: COMMERCIAL

## 2024-03-25 VITALS
WEIGHT: 165 LBS | BODY MASS INDEX: 29.23 KG/M2 | HEART RATE: 71 BPM | HEIGHT: 63 IN | OXYGEN SATURATION: 97 % | DIASTOLIC BLOOD PRESSURE: 78 MMHG | SYSTOLIC BLOOD PRESSURE: 120 MMHG

## 2024-03-25 VITALS
OXYGEN SATURATION: 96 % | DIASTOLIC BLOOD PRESSURE: 78 MMHG | BODY MASS INDEX: 29.41 KG/M2 | WEIGHT: 166 LBS | HEART RATE: 95 BPM | SYSTOLIC BLOOD PRESSURE: 128 MMHG | HEIGHT: 63 IN

## 2024-03-25 DIAGNOSIS — R93.89 ABNORMAL CT SCAN: Primary | ICD-10-CM

## 2024-03-25 DIAGNOSIS — R07.9 CHEST PAIN, UNSPECIFIED TYPE: Primary | ICD-10-CM

## 2024-03-25 PROCEDURE — 99213 OFFICE O/P EST LOW 20 MIN: CPT | Performed by: STUDENT IN AN ORGANIZED HEALTH CARE EDUCATION/TRAINING PROGRAM

## 2024-03-25 PROCEDURE — 1036F TOBACCO NON-USER: CPT | Performed by: STUDENT IN AN ORGANIZED HEALTH CARE EDUCATION/TRAINING PROGRAM

## 2024-03-25 PROCEDURE — 99214 OFFICE O/P EST MOD 30 MIN: CPT | Performed by: STUDENT IN AN ORGANIZED HEALTH CARE EDUCATION/TRAINING PROGRAM

## 2024-03-25 ASSESSMENT — PATIENT HEALTH QUESTIONNAIRE - PHQ9
2. FEELING DOWN, DEPRESSED OR HOPELESS: MORE THAN HALF THE DAYS
SUM OF ALL RESPONSES TO PHQ9 QUESTIONS 1 AND 2: 4
1. LITTLE INTEREST OR PLEASURE IN DOING THINGS: MORE THAN HALF THE DAYS

## 2024-03-25 NOTE — PROGRESS NOTES
Subjective   Patient ID: Bonny Koehler is a 58 y.o. female who presents for pt here for 3 month med check (Pt had side affects to Prolia, elevated BP,  b/l foot pain).    HPI      Hypertension: Blood pressure is normal today.   Has been normal in the past. This is listed as one of the side-effects of prolia as well. Since not taking it her blood pressure has been better as well.   Recommended to monitor BP at home.    She recently had CT cardiac scoring.   Test showed sclerotic lesions and recommended dedicated thoracic spine imaging.   CT thoracic spine is ordered.     Patient is here to discuss about multiple symptoms that she has been having since taking Prolia. Reports that she started to have symptoms by 2-3 days after the injection.   Hip pain - improved after a back injection and exercises.   Itching   Insomnia  Pain in the neck, weakness in the neck muscles  Hip pain  Jaw pain/gum pain  She reports that as time passed her symptoms are better without the medication. She is going to talk to rheumatologist further about other options. The options that were offered does not seem to interest her due to concerns of side-effects.       Review of Systems  ROS negative except discussed above in HPI.    Vitals:    03/25/24 0843   BP: 128/78   Pulse: 95   SpO2: 96%     Objective   Physical Exam  Constitutional:       Appearance: Normal appearance.   Neurological:      Mental Status: She is alert.           Assessment/Plan   Bonny was seen today for pt here for 3 month med check.  Diagnoses and all orders for this visit:  Abnormal CT scan (Primary)  -     CT thoracic spine wo IV contrast; Future      Follow up in 3 months.         Gulshan Dodge MD MPH

## 2024-03-25 NOTE — PROGRESS NOTES
Chief Complaint   Patient presents with    Follow-up     Test results     HPI:  I was requested by Dr. Dodge to evaluate this patient in consultation for cardiac assessment.     Mrs. Bonny Koehler is a 58 y.o. year old former smoker female patient with past medical history significant for rheumatoid arthritis, insomnia, osteoporosis, coming for assessment of chest pain. Patient with recent visits to ER due to chest pain, last visit 2024. She endorses 1 week of intermittent symptoms, initially began as left arm pain that went from the neck all the way down her arm, has a history of rheumatoid arthritis and felt like it was an exacerbation of her rheumatoid arthritis. Then started having some shortness of breath with exertion over the last few days, chest burning that has been intermittent but more constant over the last few days. does endorse some nausea and intermittent episodes of diaphoresis. She also endorses episodes of situations when she cannot speak what she is thinking. Notably, she also states that she was having some symptoms after starting a new medication (Prolia) in November, and injection for her osteoporosis and believes that a lot of her issues are due to this medication.  She denies leg edema, headaches, fever, chills, orthopnea, paroxysmal nocturnal dyspnea or syncope.    EKG shows normal sinus rhythm with non-specific ST-T changes.    Patient coming today for discussion of the test results.      Past Medical History  Past Medical History:   Diagnosis Date    Osteoporosis     RA (rheumatoid arthritis) (CMS/[facility]ly [facility])        Past Surgical History  Past Surgical History:   Procedure Laterality Date    HYSTERECTOMY      hemorrage fix    OTHER SURGICAL HISTORY  2020     section    OTHER SURGICAL HISTORY  2020    Hemorrhoidectomy    OTHER SURGICAL HISTORY  2020    Colonoscopy    OTHER SURGICAL HISTORY  2021    Hysterectomy       Past Family History  Family History  "  Problem Relation Name Age of Onset    Heart attack Mother      Hypertension Mother      Brain cancer Father      Uterine cancer Sister         Allergy History  No Known Allergies    Past Social History  Social History     Socioeconomic History    Marital status:      Spouse name: None    Number of children: None    Years of education: None    Highest education level: None   Occupational History    None   Tobacco Use    Smoking status: Former     Packs/day: 1.50     Years: 41.00     Additional pack years: 0.00     Total pack years: 61.50     Types: Cigarettes     Quit date: 2014     Years since quitting: 10.2    Smokeless tobacco: Never   Vaping Use    Vaping Use: Former   Substance and Sexual Activity    Alcohol use: Not Currently    Drug use: Never    Sexual activity: None   Other Topics Concern    None   Social History Narrative    None     Social Determinants of Health     Financial Resource Strain: Not on file   Food Insecurity: Not on file   Transportation Needs: Not on file   Physical Activity: Not on file   Stress: Not on file   Social Connections: Not on file   Intimate Partner Violence: Not on file   Housing Stability: Not on file       Social History     Tobacco Use   Smoking Status Former    Packs/day: 1.50    Years: 41.00    Additional pack years: 0.00    Total pack years: 61.50    Types: Cigarettes    Quit date: 2014    Years since quitting: 10.2   Smokeless Tobacco Never           Objective Data:  Last Recorded Vitals:  Vitals:    03/25/24 1523   BP: 120/78   Pulse: 71   SpO2: 97%   Weight: 74.8 kg (165 lb)   Height: 1.588 m (5' 2.5\")       Last Labs:  CBC - 3/12/2024:  8:07 AM  4.9 13.6 168    41.7      CMP - 3/12/2024:  8:07 AM  8.9 6.8 26 --- 0.6   _ 4.2 25 52      PTT - No results in last year.  _   _ _     TROPHS   Date/Time Value Ref Range Status   02/22/2024 03:42 PM 3 0 - 13 ng/L Final   02/22/2024 02:34 PM 3 0 - 13 ng/L Final     LDLCALC   Date/Time Value Ref Range Status   03/12/2024 " 08:07  <=99 mg/dL Final     Comment:                                 Near   Borderline      AGE      Desirable  Optimal    High     High     Very High     0-19 Y     0 - 109     ---    110-129   >/= 130     ----    20-24 Y     0 - 119     ---    120-159   >/= 160     ----      >24 Y     0 -  99   100-129  130-159   160-189     >/=190       VLDL   Date/Time Value Ref Range Status   03/12/2024 08:07 AM 19 0 - 40 mg/dL Final   02/28/2023 08:36 AM 18 0 - 40 mg/dL Final   05/27/2022 08:13 AM 19 0 - 40 mg/dL Final   04/19/2021 10:33 AM 39 0 - 40 mg/dL Final        Patient Medications:  Outpatient Encounter Medications as of 3/25/2024   Medication Sig Dispense Refill    acetaminophen (Tylenol 8 HOUR) 650 mg ER tablet Take 2 tablets (1,300 mg) by mouth every 8 hours if needed for mild pain (1 - 3). Do not crush, chew, or split.      ascorbic acid (Vitamin C) 250 mg tablet Take 1 tablet (250 mg) by mouth once daily.      busPIRone (Buspar) 15 mg tablet Take 1 tablet (15 mg) by mouth 2 times a day. 180 tablet 3    cholecalciferol (Vitamin D3) 25 MCG (1000 UT) tablet Take 1 tablet (1,000 Units) by mouth once daily.      cyanocobalamin (Vitamin B-12) 100 mcg tablet Take 1 tablet (100 mcg) by mouth once daily.      docusate sodium (Colace) 100 mg capsule Take 2 capsules (200 mg) by mouth once daily at bedtime.      EnbreL SureClick 50 mg/mL (1 mL) pen injector pen injection Inject 1 mL (50 mg) under the skin 1 (one) time per week.      L. acidophilus/Bifid. animalis 32 billion cell capsule Take 1 capsule by mouth once daily at bedtime.      leflunomide (Arava) 20 mg tablet Take 1 tablet (20 mg) by mouth once every 24 hours.      magnesium oxide (Mag-Ox) 400 mg tablet Take 1 tablet (400 mg) by mouth once daily.      melatonin 3 mg tablet Take 2 tablets (6 mg) by mouth once daily at bedtime. Tri-Sleep (GNC) with valeran root and theanine      multivitamin tablet Take 1 tablet by mouth once daily.      naproxen (Naprosyn) 250  mg tablet Take 1 tablet (250 mg) by mouth if needed for mild pain (1 - 3).      omega-3 fatty acids-fish oil 300-1,000 mg capsule Take 2 capsules (2,000 mg) by mouth once daily.      omeprazole OTC (PriLOSEC OTC) 20 mg EC tablet Take 1 tablet (20 mg) by mouth if needed. Do not crush, chew, or split.      PARoxetine (Paxil) 10 mg tablet Take 1 tablet (10 mg) by mouth once daily. 90 tablet 3    PARoxetine (Paxil) 40 mg tablet Take 1 tablet (40 mg) by mouth once daily. 90 tablet 3    red yeast rice 600 mg capsule Take 2 capsules by mouth 2 times a day.      predniSONE (Deltasone) 2.5 mg tablet Take 1 tablet (2.5 mg) by mouth if needed (RA flare).       No facility-administered encounter medications on file as of 3/25/2024.       Physical Exam:  General: alert, oriented and in no acute distress  HEENT: NC/AT; EOMI; PERRLA, external ear is normal  Neck: supple; trachea midline; no masses; no JVD  Chest: clear breath sounds bilaterally; no wheezing  Cardio: regular rhythm, S1S2 normal, no murmurs  Abdomen: Soft, non-tender, non-distension, no organomegaly  Extremities: no clubbing/cyanosis/edema  Neuro: Grossly intact     Psychiatric: Normal mood and affect     Past Cardiology Results (Last 3 Years):  EKG:  ECG 12 lead 02/22/2024    Echo:  Echo Results:  Transthoracic Echo (TTE) Complete 03/18/2024    [place] [address]  Phone [phone], Fax [phone]    TRANSTHORACIC ECHOCARDIOGRAM REPORT      Patient Name:      MADIHA CEE Lozada Physician:    [id number] Mariano Muller MD  Study Date:        3/18/2024            Ordering Provider:    [id number] BHAVIK LANGSTON  MRN/PID:           [id number]             Fellow:  Accession#:        [id number]         Nurse:                Xiao Guevara RN  Date of Birth/Age: 1965 / 58 years  Sonographer:          Yuniel Bateman RDCS  Gender:            F                    Additional Staff:  Height:             157.48 cm            Admit Date:  Weight:            75.75 kg             Admission Status:     Outpatient  BSA / BMI:         1.77 m2 / 30.54      Department Location:  [facility]  kg/m2  Blood Pressure: 141 /75 mmHg    Study Type:    TRANSTHORACIC ECHO (TTE) COMPLETE  Diagnosis/ICD: Dyspnea, unspecified-R06.00  CPT Codes:     Echo Complete w Full Doppler-26564  Study Detail: The following Echo studies were performed: 2D, Doppler, M-Mode and  color flow. Definity used as a contrast agent for endocardial  border definition and agitated saline used as a contrast agent for  intraseptal flow evaluation. Total contrast used for this  procedure was 1.50cc mL via IV push.      PHYSICIAN INTERPRETATION:  Left Ventricle: Left ventricular systolic function is normal, with an estimated ejection fraction of 55-60%. There are no regional wall motion abnormalities. The left ventricular cavity size is normal. Spectral Doppler shows a normal pattern of left ventricular diastolic filling.  Left Atrium: The left atrium is normal in size. A bubble study using agitated saline was performed. Bubble study is negative.  Right Ventricle: The right ventricle is normal in size. There is normal right ventricular global systolic function.  Right Atrium: The right atrium is normal in size.  Aortic Valve: The aortic valve is trileaflet. There is mild aortic valve regurgitation. The peak instantaneous gradient of the aortic valve is 7.7 mmHg. The mean gradient of the aortic valve is 5.0 mmHg.  Mitral Valve: The mitral valve is normal in structure. There is no evidence of mitral valve regurgitation.  Tricuspid Valve: The tricuspid valve is structurally normal. No evidence of tricuspid regurgitation.  Pulmonic Valve: The pulmonic valve is structurally normal. There is trace pulmonic valve regurgitation.  Pericardium: There is no pericardial effusion noted.  Aorta: The aortic root is normal.  Systemic Veins: The inferior vena cava appears  to be of normal size. There is IVC inspiratory collapse greater than 50%.      CONCLUSIONS:  1. Left ventricular systolic function is normal with a 55-60% estimated ejection fraction.  2. Mild aortic valve regurgitation.    QUANTITATIVE DATA SUMMARY:  2D MEASUREMENTS:  Normal Ranges:  Ao Root d:     2.90 cm   (2.0-3.7cm)  LAs:           3.40 cm   (2.7-4.0cm)  IVSd:          1.06 cm   (0.6-1.1cm)  LVPWd:         0.91 cm   (0.6-1.1cm)  LVIDd:         4.34 cm   (3.9-5.9cm)  LVIDs:         2.74 cm  LV Mass Index: 79.8 g/m2  LV % FS        36.9 %    LA VOLUME:  Normal Ranges:  LA Vol A4C:        27.8 ml    (22+/-6mL/m2)  LA Vol A2C:        29.6 ml  LA Vol BP:         30.4 ml  LA Vol Index A4C:  15.7ml/m2  LA Vol Index A2C:  16.7 ml/m2  LA Vol Index BP:   17.2 ml/m2  LA Area A4C:       11.9 cm2  LA Area A2C:       13.0 cm2  LA Major Axis A4C: 4.3 cm  LA Major Axis A2C: 4.9 cm  LA Volume Index:   15.0 ml/m2  LA Vol A4C:        26.6 ml  LA Vol A2C:        29.1 ml    LV SYSTOLIC FUNCTION BY 2D PLANIMETRY (MOD):  Normal Ranges:  EF-A4C View: 57.6 % (>=55%)  EF-A2C View: 69.6 %  EF-Biplane:  61.5 %    LV DIASTOLIC FUNCTION:  Normal Ranges:  MV Peak E:    0.68 m/s (0.7-1.2 m/s)  MV Peak A:    0.83 m/s (0.42-0.7 m/s)  E/A Ratio:    0.82     (1.0-2.2)  MV lateral e' 0.10 m/s  MV medial e'  0.09 m/s    MITRAL VALVE:  Normal Ranges:  MV DT: 239 msec (150-240msec)    AORTIC VALVE:  Normal Ranges:  AoV Vmax:                1.39 m/s (<=1.7m/s)  AoV Peak P.7 mmHg (<20mmHg)  AoV Mean P.0 mmHg (1.7-11.5mmHg)  LVOT Max George:            1.09 m/s (<=1.1m/s)  AoV VTI:                 29.90 cm (18-25cm)  LVOT VTI:                27.60 cm  LVOT Diameter:           1.80 cm  (1.8-2.4cm)  AoV Area, VTI:           2.35 cm2 (2.5-5.5cm2)  AoV Area,Vmax:           2.00 cm2 (2.5-4.5cm2)  AoV Dimensionless Index: 0.92    AORTIC INSUFFICIENCY:  AI Vmax:       3.82 m/s  AI Half-time:  798 msec  AI Decel Rate: 135.50  cm/s2      RIGHT VENTRICLE:  RV Basal 2.92 cm  RV Mid   2.08 cm  RV Major 6.8 cm  TAPSE:   16.1 mm  RV s'    0.12 m/s      [id number] Mariano Muller MD  Electronically signed on 3/18/2024 at 11:33:15 AM        ** Final **     Cath:  No results found for this or any previous visit from the past 1095 days.    CV NCDR CATHPCI V5 COLLECTION FORM   Stress Test:  Nuclear Stress Test 03/11/2024    Cardiac Imaging:  No results found for this or any previous visit from the past 1095 days.       Assessment/Plan   Mrs. Bonny Koehler is a 58 y.o. year old former smoker female patient with past medical history significant for rheumatoid arthritis, insomnia, osteoporosis, coming for assessment of chest pain while on Prolia medication.      Assessment     # Chest pain / SOB on exertion  - Patient with recent visits to ER due to chest pain, last visit 02/22/2024.   - She endorses 1 week of intermittent symptoms, initially began as left arm pain that went from the neck all the way down her arm, has a history of rheumatoid arthritis and felt like it was an exacerbation of her rheumatoid arthritis. Then started having some shortness of breath with exertion over the last few days, chest burning that has been intermittent but more constant over the last few days. does endorse some nausea and intermittent episodes of diaphoresis.   - Notably, she also states that she was having some symptoms after starting a new medication (Prolia) in November, and injection for her osteoporosis and believes that a lot of her issues are due to this medication.    - Symptoms may be related to the interaction of Prolia medication and rheumatoid arthritis  - Nuclear stress test:  1. Negative myocardial perfusion study without evidence of inducible myocardial ischemia or prior infarction.  2. The left ventricle is normal in size.  3. Normal LV wall motion with a post-stress LV EF estimated greater than 65%.  4. Low-dose CT demonstrates sclerotic lesions in the  upper thoracic spine, correlate with future cross-sectional imaging is recommended.  - The echocardiogram showed normal LVEF 55-60% with no wall motion abnormalities. Mild aortic valve regurgitation.   - CT calcium scoring is zero  - 24h holter monitor:  *48h holter monitor  *The predominant rhythm was Sinus.  *The Maximum Heart Rate recorded was 167 bpm, 03/12 16:51:40, the Minimum Heart Rate recorded was 54 bpm, 03/11 22:31:10, and the Average Heart Rate was 84 bpm.  *There were 3,380 VE beats with a burden of 2 %.  *There were 111 SVE beats with a burden of <1 %. There were 4 occurrences of Supraventricular Tachycardia with the Fastest episode 167 bpm, 03/12 16:51:36, and the Longest episode 19 beats, 03/12 16:51:36.  *There were 6 Patient Triggers.  *No signs of atrial fibrillation, atrial flutter, sinus pauses or sustained malignant arrhythmias.  - US carotid bilaterally.  Right Carotid: Findings are consistent with less than 50% stenosis of the right proximal internal carotid artery. Laminar flow seen by color Doppler. Right external carotid artery appears patent with no evidence of stenosis. The right vertebral artery is patent with antegrade flow. No evidence of hemodynamically significant stenosis in the right subclavian artery.  Left Carotid: Findings are consistent with less than 50% stenosis of the left proximal internal carotid artery. Left external carotid artery appears patent with no evidence of stenosis. The left vertebral artery is patent with antegrade flow. No evidence of hemodynamically significant stenosis in the left subclavian artery.  Imaging & Doppler Findings:  Right Plaque Morph: No plaque identified in the right carotid artery.  Left Plaque Morph: The left carotid bulb demonstrates heterogenous plaque.  - It is clear that the chest pain was related to the Prolia medication.  - Would suggest to keep current medication  - Follow up yearly.     # Insomnia  - Keep Melatonin.      This note  was transcribed using the Dragon Dictation system. There may be grammatical, punctuation, or verbiage errors that occur with voice recognition programs.     Counseling greater than 50% of visit regarding all cardiac issues.     Thank you, Dr. Dodge, for allowing me to participate in the care of this patient. Please do not hesitate to contact me with any further questions or concerns.     Joseph Roe MD  Cardiology

## 2024-03-26 ENCOUNTER — HOSPITAL ENCOUNTER (OUTPATIENT)
Dept: RADIOLOGY | Facility: HOSPITAL | Age: 59
Discharge: HOME | End: 2024-03-26
Payer: COMMERCIAL

## 2024-03-26 DIAGNOSIS — R93.89 ABNORMAL CT SCAN: ICD-10-CM

## 2024-03-26 PROCEDURE — 72128 CT CHEST SPINE W/O DYE: CPT | Performed by: RADIOLOGY

## 2024-03-26 PROCEDURE — 72128 CT CHEST SPINE W/O DYE: CPT

## 2024-04-22 ENCOUNTER — OFFICE (OUTPATIENT)
Dept: URBAN - METROPOLITAN AREA CLINIC 27 | Facility: CLINIC | Age: 59
End: 2024-04-22

## 2024-04-22 VITALS
DIASTOLIC BLOOD PRESSURE: 79 MMHG | HEART RATE: 72 BPM | SYSTOLIC BLOOD PRESSURE: 134 MMHG | WEIGHT: 162 LBS | HEIGHT: 62 IN

## 2024-04-22 DIAGNOSIS — K59.09 OTHER CONSTIPATION: ICD-10-CM

## 2024-04-22 DIAGNOSIS — K57.90 DIVERTICULOSIS OF INTESTINE, PART UNSPECIFIED, WITHOUT PERFO: ICD-10-CM

## 2024-04-22 PROCEDURE — 99204 OFFICE O/P NEW MOD 45 MIN: CPT | Performed by: INTERNAL MEDICINE

## 2024-04-30 ENCOUNTER — TELEPHONE (OUTPATIENT)
Dept: PRIMARY CARE | Facility: CLINIC | Age: 59
End: 2024-04-30
Payer: COMMERCIAL

## 2024-04-30 NOTE — TELEPHONE ENCOUNTER
Jennifer asking for any spine imaging patient had done to be faxed to [phone] can call [phone] option 2

## 2024-05-02 ENCOUNTER — TELEPHONE (OUTPATIENT)
Dept: PRIMARY CARE | Facility: CLINIC | Age: 59
End: 2024-05-02
Payer: COMMERCIAL

## 2024-05-02 NOTE — TELEPHONE ENCOUNTER
Looking to get results for Spine Imaging. Please fax to: [phone] or call [phone] option #2 for nurse station. Please call with questions

## 2024-05-20 VITALS
HEART RATE: 89 BPM | DIASTOLIC BLOOD PRESSURE: 63 MMHG | SYSTOLIC BLOOD PRESSURE: 142 MMHG | DIASTOLIC BLOOD PRESSURE: 67 MMHG | TEMPERATURE: 98 F | OXYGEN SATURATION: 99 % | DIASTOLIC BLOOD PRESSURE: 79 MMHG | DIASTOLIC BLOOD PRESSURE: 98 MMHG | SYSTOLIC BLOOD PRESSURE: 132 MMHG | SYSTOLIC BLOOD PRESSURE: 115 MMHG | RESPIRATION RATE: 15 BRPM | SYSTOLIC BLOOD PRESSURE: 140 MMHG | OXYGEN SATURATION: 100 % | DIASTOLIC BLOOD PRESSURE: 65 MMHG | OXYGEN SATURATION: 99 % | OXYGEN SATURATION: 97 % | RESPIRATION RATE: 16 BRPM | SYSTOLIC BLOOD PRESSURE: 136 MMHG | SYSTOLIC BLOOD PRESSURE: 123 MMHG | SYSTOLIC BLOOD PRESSURE: 123 MMHG | RESPIRATION RATE: 16 BRPM | HEART RATE: 82 BPM | OXYGEN SATURATION: 91 % | HEART RATE: 84 BPM | OXYGEN SATURATION: 99 % | SYSTOLIC BLOOD PRESSURE: 134 MMHG | OXYGEN SATURATION: 91 % | HEART RATE: 90 BPM | DIASTOLIC BLOOD PRESSURE: 86 MMHG | RESPIRATION RATE: 11 BRPM | SYSTOLIC BLOOD PRESSURE: 136 MMHG | RESPIRATION RATE: 15 BRPM | HEART RATE: 82 BPM | SYSTOLIC BLOOD PRESSURE: 131 MMHG | SYSTOLIC BLOOD PRESSURE: 132 MMHG | TEMPERATURE: 98 F | TEMPERATURE: 98 F | DIASTOLIC BLOOD PRESSURE: 75 MMHG | HEART RATE: 93 BPM | HEART RATE: 90 BPM | OXYGEN SATURATION: 100 % | RESPIRATION RATE: 14 BRPM | HEART RATE: 97 BPM | OXYGEN SATURATION: 100 % | DIASTOLIC BLOOD PRESSURE: 87 MMHG | DIASTOLIC BLOOD PRESSURE: 86 MMHG | HEART RATE: 83 BPM | SYSTOLIC BLOOD PRESSURE: 122 MMHG | DIASTOLIC BLOOD PRESSURE: 67 MMHG | OXYGEN SATURATION: 98 % | DIASTOLIC BLOOD PRESSURE: 81 MMHG | DIASTOLIC BLOOD PRESSURE: 72 MMHG | OXYGEN SATURATION: 93 % | SYSTOLIC BLOOD PRESSURE: 142 MMHG | DIASTOLIC BLOOD PRESSURE: 87 MMHG | RESPIRATION RATE: 11 BRPM | OXYGEN SATURATION: 93 % | DIASTOLIC BLOOD PRESSURE: 78 MMHG | HEART RATE: 84 BPM | DIASTOLIC BLOOD PRESSURE: 79 MMHG | SYSTOLIC BLOOD PRESSURE: 140 MMHG | SYSTOLIC BLOOD PRESSURE: 117 MMHG | HEIGHT: 62 IN | SYSTOLIC BLOOD PRESSURE: 117 MMHG | HEART RATE: 89 BPM | DIASTOLIC BLOOD PRESSURE: 98 MMHG | HEIGHT: 62 IN | RESPIRATION RATE: 14 BRPM | DIASTOLIC BLOOD PRESSURE: 65 MMHG | DIASTOLIC BLOOD PRESSURE: 63 MMHG | HEART RATE: 89 BPM | SYSTOLIC BLOOD PRESSURE: 120 MMHG | SYSTOLIC BLOOD PRESSURE: 134 MMHG | DIASTOLIC BLOOD PRESSURE: 86 MMHG | SYSTOLIC BLOOD PRESSURE: 131 MMHG | SYSTOLIC BLOOD PRESSURE: 131 MMHG | SYSTOLIC BLOOD PRESSURE: 122 MMHG | SYSTOLIC BLOOD PRESSURE: 140 MMHG | HEART RATE: 70 BPM | DIASTOLIC BLOOD PRESSURE: 81 MMHG | OXYGEN SATURATION: 91 % | RESPIRATION RATE: 10 BRPM | OXYGEN SATURATION: 98 % | DIASTOLIC BLOOD PRESSURE: 72 MMHG | HEIGHT: 62 IN | HEART RATE: 90 BPM | SYSTOLIC BLOOD PRESSURE: 134 MMHG | OXYGEN SATURATION: 95 % | DIASTOLIC BLOOD PRESSURE: 75 MMHG | HEART RATE: 70 BPM | HEART RATE: 93 BPM | DIASTOLIC BLOOD PRESSURE: 98 MMHG | OXYGEN SATURATION: 97 % | HEART RATE: 97 BPM | HEART RATE: 93 BPM | RESPIRATION RATE: 15 BRPM | DIASTOLIC BLOOD PRESSURE: 67 MMHG | RESPIRATION RATE: 10 BRPM | SYSTOLIC BLOOD PRESSURE: 142 MMHG | SYSTOLIC BLOOD PRESSURE: 132 MMHG | HEART RATE: 84 BPM | DIASTOLIC BLOOD PRESSURE: 78 MMHG | RESPIRATION RATE: 10 BRPM | RESPIRATION RATE: 11 BRPM | DIASTOLIC BLOOD PRESSURE: 79 MMHG | OXYGEN SATURATION: 95 % | HEART RATE: 83 BPM | RESPIRATION RATE: 12 BRPM | HEART RATE: 82 BPM | SYSTOLIC BLOOD PRESSURE: 120 MMHG | SYSTOLIC BLOOD PRESSURE: 117 MMHG | RESPIRATION RATE: 16 BRPM | RESPIRATION RATE: 14 BRPM | DIASTOLIC BLOOD PRESSURE: 65 MMHG | RESPIRATION RATE: 12 BRPM | DIASTOLIC BLOOD PRESSURE: 72 MMHG | OXYGEN SATURATION: 98 % | DIASTOLIC BLOOD PRESSURE: 81 MMHG | SYSTOLIC BLOOD PRESSURE: 115 MMHG | OXYGEN SATURATION: 93 % | OXYGEN SATURATION: 97 % | HEART RATE: 97 BPM | DIASTOLIC BLOOD PRESSURE: 63 MMHG | SYSTOLIC BLOOD PRESSURE: 136 MMHG | DIASTOLIC BLOOD PRESSURE: 75 MMHG | HEART RATE: 83 BPM | RESPIRATION RATE: 12 BRPM | SYSTOLIC BLOOD PRESSURE: 122 MMHG | SYSTOLIC BLOOD PRESSURE: 123 MMHG | SYSTOLIC BLOOD PRESSURE: 115 MMHG | OXYGEN SATURATION: 95 % | DIASTOLIC BLOOD PRESSURE: 78 MMHG | SYSTOLIC BLOOD PRESSURE: 120 MMHG | HEART RATE: 70 BPM | DIASTOLIC BLOOD PRESSURE: 87 MMHG

## 2024-05-28 ENCOUNTER — AMBULATORY SURGICAL CENTER (OUTPATIENT)
Dept: URBAN - METROPOLITAN AREA SURGERY 12 | Facility: SURGERY | Age: 59
End: 2024-05-28
Payer: COMMERCIAL

## 2024-05-28 ENCOUNTER — OFFICE (OUTPATIENT)
Dept: URBAN - METROPOLITAN AREA PATHOLOGY 2 | Facility: PATHOLOGY | Age: 59
End: 2024-05-28
Payer: COMMERCIAL

## 2024-05-28 DIAGNOSIS — K64.8 OTHER HEMORRHOIDS: ICD-10-CM

## 2024-05-28 DIAGNOSIS — K59.09 OTHER CONSTIPATION: ICD-10-CM

## 2024-05-28 DIAGNOSIS — K62.1 RECTAL POLYP: ICD-10-CM

## 2024-05-28 DIAGNOSIS — K64.4 RESIDUAL HEMORRHOIDAL SKIN TAGS: ICD-10-CM

## 2024-05-28 DIAGNOSIS — K57.30 DIVERTICULOSIS OF LARGE INTESTINE WITHOUT PERFORATION OR ABS: ICD-10-CM

## 2024-05-28 PROCEDURE — 88305 TISSUE EXAM BY PATHOLOGIST: CPT | Performed by: PATHOLOGY

## 2024-05-28 PROCEDURE — 45380 COLONOSCOPY AND BIOPSY: CPT | Performed by: INTERNAL MEDICINE

## 2024-06-04 ENCOUNTER — TELEPHONE (OUTPATIENT)
Dept: PRIMARY CARE | Facility: CLINIC | Age: 59
End: 2024-06-04
Payer: COMMERCIAL

## 2024-06-04 NOTE — TELEPHONE ENCOUNTER
[phone] SHE WOULD LIKE TO TALK TO YOU ABOUT A SHINGLE, PNEUMONIA SHOTS FOR THE FALL. BLOOD WORK FOR CHOLESTEROL . ANY QUESTIONS YOU CAN CALL HER. THANK YOU.

## 2024-06-05 ENCOUNTER — TELEPHONE (OUTPATIENT)
Dept: PRIMARY CARE | Facility: CLINIC | Age: 59
End: 2024-06-05
Payer: COMMERCIAL

## 2024-06-05 NOTE — TELEPHONE ENCOUNTER
SHE CALLED TO SAY A NURSE CALLED HER ABOUT SOME INFORMATION, CAN SHE PLEASE CALL HER BACK -[phone]. THANK YOU.

## 2024-06-10 NOTE — TELEPHONE ENCOUNTER
Spoke to pt and let her know what PCP advised. Expressed a verbal understanding and nothing else needed at this time.

## 2024-06-25 ENCOUNTER — APPOINTMENT (OUTPATIENT)
Dept: PRIMARY CARE | Facility: CLINIC | Age: 59
End: 2024-06-25
Payer: COMMERCIAL

## 2024-11-18 DIAGNOSIS — F32.A DEPRESSION, UNSPECIFIED DEPRESSION TYPE: ICD-10-CM

## 2024-11-18 DIAGNOSIS — F41.9 ANXIETY: ICD-10-CM

## 2024-11-18 RX ORDER — BUSPIRONE HYDROCHLORIDE 15 MG/1
15 TABLET ORAL 2 TIMES DAILY
Qty: 180 TABLET | Refills: 3 | Status: CANCELLED | OUTPATIENT
Start: 2024-11-18 | End: 2025-11-18

## 2024-11-18 RX ORDER — PAROXETINE HYDROCHLORIDE 40 MG/1
40 TABLET, FILM COATED ORAL DAILY
Qty: 90 TABLET | Refills: 0 | Status: SHIPPED | OUTPATIENT
Start: 2024-11-18 | End: 2025-11-13

## 2024-11-18 RX ORDER — PAROXETINE 10 MG/1
10 TABLET, FILM COATED ORAL DAILY
Qty: 90 TABLET | Refills: 0 | Status: SHIPPED | OUTPATIENT
Start: 2024-11-18 | End: 2025-11-13

## 2024-11-18 NOTE — TELEPHONE ENCOUNTER
Medication Refill Request    Medication:  paroxetine    Pharmacy:  June    Last OV:  12/13/23  Upcoming appointment:  not scheduled    ORDER PENDED

## 2025-01-13 ENCOUNTER — TELEPHONE (OUTPATIENT)
Dept: PRIMARY CARE | Facility: CLINIC | Age: 60
End: 2025-01-13
Payer: COMMERCIAL

## 2025-01-13 DIAGNOSIS — F32.A DEPRESSION, UNSPECIFIED DEPRESSION TYPE: ICD-10-CM

## 2025-01-13 DIAGNOSIS — F41.9 ANXIETY: ICD-10-CM

## 2025-01-13 NOTE — TELEPHONE ENCOUNTER
01/23/25  Patient is requesting refills for   Buspirone 15 mg tablet  Paroxetine 10 mg tablet  Paroxetine 40 mg tablet    [facility]

## 2025-01-14 RX ORDER — PAROXETINE HYDROCHLORIDE 40 MG/1
40 TABLET, FILM COATED ORAL DAILY
Qty: 90 TABLET | Refills: 3 | Status: SHIPPED | OUTPATIENT
Start: 2025-01-14 | End: 2026-01-14

## 2025-01-14 RX ORDER — PAROXETINE 10 MG/1
10 TABLET, FILM COATED ORAL DAILY
Qty: 90 TABLET | Refills: 3 | Status: SHIPPED | OUTPATIENT
Start: 2025-01-14 | End: 2026-01-14

## 2025-01-14 RX ORDER — BUSPIRONE HYDROCHLORIDE 15 MG/1
15 TABLET ORAL 2 TIMES DAILY
Qty: 180 TABLET | Refills: 3 | Status: SHIPPED | OUTPATIENT
Start: 2025-01-14 | End: 2026-01-14

## 2025-02-05 ENCOUNTER — APPOINTMENT (OUTPATIENT)
Dept: PRIMARY CARE | Facility: CLINIC | Age: 60
End: 2025-02-05
Payer: COMMERCIAL

## 2025-02-05 VITALS
SYSTOLIC BLOOD PRESSURE: 136 MMHG | DIASTOLIC BLOOD PRESSURE: 94 MMHG | HEART RATE: 74 BPM | HEIGHT: 63 IN | WEIGHT: 163.4 LBS | OXYGEN SATURATION: 96 % | BODY MASS INDEX: 28.95 KG/M2

## 2025-02-05 DIAGNOSIS — Z12.31 ENCOUNTER FOR SCREENING MAMMOGRAM FOR MALIGNANT NEOPLASM OF BREAST: ICD-10-CM

## 2025-02-05 DIAGNOSIS — R53.83 OTHER FATIGUE: Primary | ICD-10-CM

## 2025-02-05 DIAGNOSIS — F41.9 ANXIETY: ICD-10-CM

## 2025-02-05 DIAGNOSIS — F32.A DEPRESSION, UNSPECIFIED DEPRESSION TYPE: ICD-10-CM

## 2025-02-05 PROCEDURE — 1036F TOBACCO NON-USER: CPT

## 2025-02-05 PROCEDURE — 3008F BODY MASS INDEX DOCD: CPT

## 2025-02-05 PROCEDURE — 99214 OFFICE O/P EST MOD 30 MIN: CPT

## 2025-02-05 RX ORDER — PAROXETINE HYDROCHLORIDE 40 MG/1
40 TABLET, FILM COATED ORAL DAILY
Qty: 90 TABLET | Refills: 3 | Status: SHIPPED | OUTPATIENT
Start: 2025-02-05 | End: 2026-02-05

## 2025-02-05 RX ORDER — PAROXETINE 10 MG/1
10 TABLET, FILM COATED ORAL DAILY
Qty: 90 TABLET | Refills: 3 | Status: SHIPPED | OUTPATIENT
Start: 2025-02-05 | End: 2026-02-05

## 2025-02-05 RX ORDER — DENOSUMAB 60 MG/ML
INJECTION SUBCUTANEOUS
COMMUNITY

## 2025-02-05 RX ORDER — BUSPIRONE HYDROCHLORIDE 15 MG/1
15 TABLET ORAL 2 TIMES DAILY
Qty: 180 TABLET | Refills: 3 | Status: SHIPPED | OUTPATIENT
Start: 2025-02-05 | End: 2026-02-05

## 2025-02-05 ASSESSMENT — ENCOUNTER SYMPTOMS
NERVOUS/ANXIOUS: 0
FATIGUE: 0
WOUND: 0
TROUBLE SWALLOWING: 0
DIFFICULTY URINATING: 0
CHILLS: 0
SHORTNESS OF BREATH: 0
HEADACHES: 0
DIARRHEA: 0
FREQUENCY: 0
MYALGIAS: 0
ABDOMINAL PAIN: 0
RECTAL PAIN: 0
POLYDIPSIA: 0
NAUSEA: 0
POLYPHAGIA: 0
UNEXPECTED WEIGHT CHANGE: 0
WEAKNESS: 0
ARTHRALGIAS: 0
PALPITATIONS: 0
NUMBNESS: 0
DIAPHORESIS: 0
CONSTIPATION: 0
CHEST TIGHTNESS: 0

## 2025-02-05 ASSESSMENT — ANXIETY QUESTIONNAIRES
6. BECOMING EASILY ANNOYED OR IRRITABLE: NOT AT ALL
2. NOT BEING ABLE TO STOP OR CONTROL WORRYING: NEARLY EVERY DAY
7. FEELING AFRAID AS IF SOMETHING AWFUL MIGHT HAPPEN: NEARLY EVERY DAY
GAD7 TOTAL SCORE: 12
5. BEING SO RESTLESS THAT IT IS HARD TO SIT STILL: NOT AT ALL
4. TROUBLE RELAXING: NEARLY EVERY DAY
3. WORRYING TOO MUCH ABOUT DIFFERENT THINGS: NEARLY EVERY DAY
IF YOU CHECKED OFF ANY PROBLEMS ON THIS QUESTIONNAIRE, HOW DIFFICULT HAVE THESE PROBLEMS MADE IT FOR YOU TO DO YOUR WORK, TAKE CARE OF THINGS AT HOME, OR GET ALONG WITH OTHER PEOPLE: NOT DIFFICULT AT ALL
1. FEELING NERVOUS, ANXIOUS, OR ON EDGE: NOT AT ALL

## 2025-02-05 ASSESSMENT — PATIENT HEALTH QUESTIONNAIRE - PHQ9
1. LITTLE INTEREST OR PLEASURE IN DOING THINGS: NOT AT ALL
SUM OF ALL RESPONSES TO PHQ9 QUESTIONS 1 AND 2: 0
2. FEELING DOWN, DEPRESSED OR HOPELESS: NOT AT ALL

## 2025-02-05 NOTE — PROGRESS NOTES
"Subjective   Patient ID: Bonny Koehler is a 59 y.o. female who presents for Establish Care (Pt is here today to establish care as former PCP left. Dexa is due. ).    Patient presents today for establishment primary care.  Was previous seen by Dr. Figueroa in this office.    Anxiety: Takes combination Paxil and BuSpar.  This combo has worked for many years has been on the medications for over 30 years.  Most anxiety related to concerns about her family, health and wellbeing of her daughters and .    Rheumatoid arthritis: Follows rheumatology, has Prolia infusions.  They also manage her osteoporosis and get her DEXA scans.    Chronic fatigue: Will repeat TSH.  Symptoms unchanged.  Previous low TSH with normal T4.    Preventive health: [facility] mammogram ordered, ASCVD risk score 2.1%, routine labs ordered.           Review of Systems   Constitutional:  Negative for chills, diaphoresis, fatigue and unexpected weight change.   HENT:  Negative for dental problem, tinnitus and trouble swallowing.    Eyes:  Negative for visual disturbance.   Respiratory:  Negative for chest tightness and shortness of breath.    Cardiovascular:  Negative for chest pain, palpitations and leg swelling.   Gastrointestinal:  Negative for abdominal pain, constipation, diarrhea, nausea and rectal pain.   Endocrine: Negative for polydipsia, polyphagia and polyuria.   Genitourinary:  Negative for difficulty urinating, frequency and urgency.   Musculoskeletal:  Negative for arthralgias and myalgias.   Skin:  Negative for pallor and wound.   Neurological:  Negative for syncope, weakness, numbness and headaches.   Psychiatric/Behavioral:  Negative for suicidal ideas. The patient is not nervous/anxious.        Objective   BP (!) 136/94   Pulse 74   Ht 1.588 m (5' 2.5\")   Wt 74.1 kg (163 lb 6.4 oz)   SpO2 96%   BMI 29.41 kg/m²     Physical Exam  Vitals and nursing note reviewed.   Constitutional:       " General: She is not in acute distress.     Appearance: Normal appearance. She is normal weight.   HENT:      Head: Normocephalic.      Nose: Nose normal.      Mouth/Throat:      Mouth: Mucous membranes are moist.      Pharynx: Oropharynx is clear.   Eyes:      General: No scleral icterus.     Pupils: Pupils are equal, round, and reactive to light.   Neck:      Vascular: No carotid bruit.   Cardiovascular:      Rate and Rhythm: Normal rate and regular rhythm.      Pulses: Normal pulses.      Heart sounds: Normal heart sounds. No murmur heard.  Pulmonary:      Effort: Pulmonary effort is normal. No respiratory distress.      Breath sounds: Normal breath sounds. No stridor. No wheezing, rhonchi or rales.   Abdominal:      General: Bowel sounds are normal. There is no distension.      Palpations: Abdomen is soft. There is no mass.      Tenderness: There is no abdominal tenderness.      Hernia: No hernia is present.   Musculoskeletal:         General: No swelling. Normal range of motion.      Cervical back: Normal range of motion.   Skin:     General: Skin is warm and dry.      Capillary Refill: Capillary refill takes less than 2 seconds.   Neurological:      General: No focal deficit present.      Mental Status: She is alert and oriented to person, place, and time. Mental status is at baseline.   Psychiatric:         Mood and Affect: Mood normal.         Behavior: Behavior normal.         Thought Content: Thought content normal.         Judgment: Judgment normal.         Assessment/Plan   Diagnoses and all orders for this visit:  Other fatigue  -     Basic Metabolic Panel; Future  -     TSH with reflex to Free T4 if abnormal; Future  -     CBC; Future  Anxiety  -     PARoxetine (Paxil) 40 mg tablet; Take 1 tablet (40 mg) by mouth once daily.  -     PARoxetine (Paxil) 10 mg tablet; Take 1 tablet (10 mg) by mouth once daily.  -     busPIRone (Buspar) 15 mg tablet; Take 1 tablet (15 mg) by mouth 2 times a day.  Depression,  unspecified depression type  -     PARoxetine (Paxil) 40 mg tablet; Take 1 tablet (40 mg) by mouth once daily.  -     PARoxetine (Paxil) 10 mg tablet; Take 1 tablet (10 mg) by mouth once daily.  -     busPIRone (Buspar) 15 mg tablet; Take 1 tablet (15 mg) by mouth 2 times a day.  Encounter for screening mammogram for malignant neoplasm of breast  -     BI mammo bilateral screening tomosynthesis; Future

## 2025-06-10 ENCOUNTER — HOSPITAL ENCOUNTER (OUTPATIENT)
Dept: RADIOLOGY | Facility: CLINIC | Age: 60
Discharge: HOME | End: 2025-06-10
Payer: COMMERCIAL

## 2025-06-10 VITALS — BODY MASS INDEX: 28.95 KG/M2 | WEIGHT: 163.38 LBS | HEIGHT: 63 IN

## 2025-06-10 DIAGNOSIS — Z12.31 ENCOUNTER FOR SCREENING MAMMOGRAM FOR MALIGNANT NEOPLASM OF BREAST: ICD-10-CM

## 2025-06-10 PROCEDURE — 77063 BREAST TOMOSYNTHESIS BI: CPT | Performed by: RADIOLOGY

## 2025-06-10 PROCEDURE — 77067 SCR MAMMO BI INCL CAD: CPT

## 2025-06-10 PROCEDURE — 77067 SCR MAMMO BI INCL CAD: CPT | Performed by: RADIOLOGY

## 2025-06-12 ENCOUNTER — HOSPITAL ENCOUNTER (OUTPATIENT)
Dept: RADIOLOGY | Facility: EXTERNAL LOCATION | Age: 60
Discharge: HOME | End: 2025-06-12

## 2026-02-10 ENCOUNTER — APPOINTMENT (OUTPATIENT)
Dept: PRIMARY CARE | Facility: CLINIC | Age: 61
End: 2026-02-10
Payer: COMMERCIAL